# Patient Record
Sex: MALE | Race: BLACK OR AFRICAN AMERICAN | NOT HISPANIC OR LATINO | URBAN - METROPOLITAN AREA
[De-identification: names, ages, dates, MRNs, and addresses within clinical notes are randomized per-mention and may not be internally consistent; named-entity substitution may affect disease eponyms.]

---

## 2017-04-15 ENCOUNTER — EMERGENCY (EMERGENCY)
Facility: HOSPITAL | Age: 22
LOS: 1 days | Discharge: HOME | End: 2017-04-15
Attending: EMERGENCY MEDICINE | Admitting: EMERGENCY MEDICINE
Payer: COMMERCIAL

## 2017-04-15 VITALS
DIASTOLIC BLOOD PRESSURE: 56 MMHG | TEMPERATURE: 99 F | RESPIRATION RATE: 20 BRPM | OXYGEN SATURATION: 97 % | HEART RATE: 68 BPM | SYSTOLIC BLOOD PRESSURE: 122 MMHG

## 2017-04-15 DIAGNOSIS — M25.522 PAIN IN LEFT ELBOW: ICD-10-CM

## 2017-04-15 DIAGNOSIS — Z79.899 OTHER LONG TERM (CURRENT) DRUG THERAPY: ICD-10-CM

## 2017-04-15 DIAGNOSIS — M77.02 MEDIAL EPICONDYLITIS, LEFT ELBOW: ICD-10-CM

## 2017-04-15 DIAGNOSIS — Z87.828 PERSONAL HISTORY OF OTHER (HEALED) PHYSICAL INJURY AND TRAUMA: Chronic | ICD-10-CM

## 2017-04-15 PROCEDURE — 99283 EMERGENCY DEPT VISIT LOW MDM: CPT

## 2017-04-15 PROCEDURE — 73080 X-RAY EXAM OF ELBOW: CPT

## 2017-04-15 PROCEDURE — 73080 X-RAY EXAM OF ELBOW: CPT | Mod: 26,LT

## 2017-04-15 RX ORDER — IBUPROFEN 200 MG
600 TABLET ORAL ONCE
Qty: 0 | Refills: 0 | Status: COMPLETED | OUTPATIENT
Start: 2017-04-15 | End: 2017-04-15

## 2017-04-15 RX ADMIN — Medication 600 MILLIGRAM(S): at 11:01

## 2017-04-15 NOTE — ED PROVIDER NOTE - PHYSICAL EXAMINATION
+td medial epicondyle area of left elbow, equistely tender to palpation, +inflammation, no reddness, NV intact

## 2017-04-15 NOTE — ED PROVIDER NOTE - OBJECTIVE STATEMENT
Dr. Lundberg Note: 22M with previous left knee injury was walking up stairs yesterday morning when that knee gave out and he landed on his left elbow.  Initially not much pain but this morning woke up with severe pain to ulnar side of elbow especially with movement and touch, assoc with paresthesia of pinky finger.  No motor weakness, no rash, no fever.

## 2018-02-21 ENCOUNTER — EMERGENCY (EMERGENCY)
Facility: HOSPITAL | Age: 23
LOS: 1 days | Discharge: HOME | End: 2018-02-21
Attending: EMERGENCY MEDICINE | Admitting: EMERGENCY MEDICINE
Payer: COMMERCIAL

## 2018-02-21 VITALS
OXYGEN SATURATION: 100 % | WEIGHT: 229.72 LBS | SYSTOLIC BLOOD PRESSURE: 133 MMHG | HEART RATE: 80 BPM | DIASTOLIC BLOOD PRESSURE: 65 MMHG | TEMPERATURE: 98 F | RESPIRATION RATE: 18 BRPM

## 2018-02-21 VITALS
RESPIRATION RATE: 16 BRPM | DIASTOLIC BLOOD PRESSURE: 71 MMHG | TEMPERATURE: 99 F | HEART RATE: 85 BPM | OXYGEN SATURATION: 100 % | SYSTOLIC BLOOD PRESSURE: 122 MMHG

## 2018-02-21 DIAGNOSIS — Z87.828 PERSONAL HISTORY OF OTHER (HEALED) PHYSICAL INJURY AND TRAUMA: Chronic | ICD-10-CM

## 2018-02-21 PROCEDURE — 99283 EMERGENCY DEPT VISIT LOW MDM: CPT

## 2018-02-21 RX ORDER — CIPROFLOXACIN AND DEXAMETHASONE 3; 1 MG/ML; MG/ML
4 SUSPENSION/ DROPS AURICULAR (OTIC)
Qty: 0.5 | Refills: 0 | OUTPATIENT
Start: 2018-02-21 | End: 2018-02-27

## 2018-02-21 RX ORDER — PSEUDOEPHEDRINE HCL 30 MG
1 TABLET ORAL
Qty: 28 | Refills: 0
Start: 2018-02-21 | End: 2018-02-27

## 2018-02-21 NOTE — ED PROVIDER NOTE - ATTENDING CONTRIBUTION TO CARE
Smita Mosley MD - Attending Physician: I have personally seen and examined this patient with the resident/fellow.  I have fully participated in the care of this patient. I have reviewed all pertinent clinical information, including history, physical exam, plan and the Resident/Fellow’s note and agree except as noted. See MDM

## 2018-02-21 NOTE — ED PROVIDER NOTE - OBJECTIVE STATEMENT
Pt reports 3 days of tension headache and right ear pain. Reports in Nov 17, had ear pain and was given ear drops and oral antibtiocs, which improved the symptoms but now they have returned. He has not seen ENT before. Notes no fever. No neck pain. No runny nose. No sore throat. Took an oxycodone for his pain with minimal relief. Did not try anything else for his symptoms Pt reports 3 days of tension headache and right ear pain. Reports in Nov 17, had ear pain and was given ear drops and oral antibiotics, which improved the symptoms but now they have returned. He has not seen ENT before. Notes no fever. No neck pain. No runny nose. No sore throat. Took an oxycodone for his pain with minimal relief. Did not try anything else for his symptoms. Notes no hearing changes. No drainage

## 2018-02-21 NOTE — ED PROVIDER NOTE - MEDICAL DECISION MAKING DETAILS
Smita Mosley MD - Attending Physician: Pt here with right ear pain and headache. Noted effusions with some sinus congestion. Right ear canal inflamed with some erythema to TM but no purulence or bulging. Trial of pseudoephedrine, ciprodex and will given f/u to ENT if pain continues

## 2018-03-06 ENCOUNTER — EMERGENCY (EMERGENCY)
Facility: HOSPITAL | Age: 23
LOS: 1 days | Discharge: HOME | End: 2018-03-06
Attending: EMERGENCY MEDICINE | Admitting: EMERGENCY MEDICINE
Payer: MEDICAID

## 2018-03-06 VITALS
DIASTOLIC BLOOD PRESSURE: 68 MMHG | OXYGEN SATURATION: 100 % | RESPIRATION RATE: 18 BRPM | HEART RATE: 79 BPM | TEMPERATURE: 98 F | SYSTOLIC BLOOD PRESSURE: 114 MMHG | WEIGHT: 225.09 LBS

## 2018-03-06 VITALS
OXYGEN SATURATION: 100 % | SYSTOLIC BLOOD PRESSURE: 136 MMHG | RESPIRATION RATE: 15 BRPM | DIASTOLIC BLOOD PRESSURE: 78 MMHG | TEMPERATURE: 99 F | HEART RATE: 64 BPM

## 2018-03-06 DIAGNOSIS — Z87.828 PERSONAL HISTORY OF OTHER (HEALED) PHYSICAL INJURY AND TRAUMA: Chronic | ICD-10-CM

## 2018-03-06 LAB
ALBUMIN SERPL ELPH-MCNC: 4.3 G/DL — SIGNIFICANT CHANGE UP (ref 3.3–5)
ALP SERPL-CCNC: 66 U/L — SIGNIFICANT CHANGE UP (ref 40–120)
ALT FLD-CCNC: 42 U/L RC — SIGNIFICANT CHANGE UP (ref 10–45)
ANION GAP SERPL CALC-SCNC: 16 MMOL/L — SIGNIFICANT CHANGE UP (ref 5–17)
AST SERPL-CCNC: 36 U/L — SIGNIFICANT CHANGE UP (ref 10–40)
BASOPHILS # BLD AUTO: 0.1 K/UL — SIGNIFICANT CHANGE UP (ref 0–0.2)
BASOPHILS NFR BLD AUTO: 1.6 % — SIGNIFICANT CHANGE UP (ref 0–2)
BILIRUB SERPL-MCNC: 0.3 MG/DL — SIGNIFICANT CHANGE UP (ref 0.2–1.2)
BUN SERPL-MCNC: 13 MG/DL — SIGNIFICANT CHANGE UP (ref 7–23)
CALCIUM SERPL-MCNC: 9.6 MG/DL — SIGNIFICANT CHANGE UP (ref 8.4–10.5)
CHLORIDE SERPL-SCNC: 103 MMOL/L — SIGNIFICANT CHANGE UP (ref 96–108)
CO2 SERPL-SCNC: 20 MMOL/L — LOW (ref 22–31)
CREAT SERPL-MCNC: 0.91 MG/DL — SIGNIFICANT CHANGE UP (ref 0.5–1.3)
EOSINOPHIL # BLD AUTO: 0.1 K/UL — SIGNIFICANT CHANGE UP (ref 0–0.5)
EOSINOPHIL NFR BLD AUTO: 1.5 % — SIGNIFICANT CHANGE UP (ref 0–6)
GAS PNL BLDV: SIGNIFICANT CHANGE UP
GLUCOSE SERPL-MCNC: 79 MG/DL — SIGNIFICANT CHANGE UP (ref 70–99)
HCT VFR BLD CALC: 49.8 % — SIGNIFICANT CHANGE UP (ref 39–50)
HGB BLD-MCNC: 16.4 G/DL — SIGNIFICANT CHANGE UP (ref 13–17)
LYMPHOCYTES # BLD AUTO: 1.4 K/UL — SIGNIFICANT CHANGE UP (ref 1–3.3)
LYMPHOCYTES # BLD AUTO: 24.8 % — SIGNIFICANT CHANGE UP (ref 13–44)
MCHC RBC-ENTMCNC: 30.5 PG — SIGNIFICANT CHANGE UP (ref 27–34)
MCHC RBC-ENTMCNC: 33 GM/DL — SIGNIFICANT CHANGE UP (ref 32–36)
MCV RBC AUTO: 92.3 FL — SIGNIFICANT CHANGE UP (ref 80–100)
MONOCYTES # BLD AUTO: 0.4 K/UL — SIGNIFICANT CHANGE UP (ref 0–0.9)
MONOCYTES NFR BLD AUTO: 6.9 % — SIGNIFICANT CHANGE UP (ref 2–14)
NEUTROPHILS # BLD AUTO: 3.8 K/UL — SIGNIFICANT CHANGE UP (ref 1.8–7.4)
NEUTROPHILS NFR BLD AUTO: 65.3 % — SIGNIFICANT CHANGE UP (ref 43–77)
PLATELET # BLD AUTO: 288 K/UL — SIGNIFICANT CHANGE UP (ref 150–400)
POTASSIUM SERPL-MCNC: 5 MMOL/L — SIGNIFICANT CHANGE UP (ref 3.5–5.3)
POTASSIUM SERPL-SCNC: 5 MMOL/L — SIGNIFICANT CHANGE UP (ref 3.5–5.3)
PROT SERPL-MCNC: 8 G/DL — SIGNIFICANT CHANGE UP (ref 6–8.3)
RBC # BLD: 5.39 M/UL — SIGNIFICANT CHANGE UP (ref 4.2–5.8)
RBC # FLD: 12 % — SIGNIFICANT CHANGE UP (ref 10.3–14.5)
SODIUM SERPL-SCNC: 139 MMOL/L — SIGNIFICANT CHANGE UP (ref 135–145)
WBC # BLD: 5.8 K/UL — SIGNIFICANT CHANGE UP (ref 3.8–10.5)
WBC # FLD AUTO: 5.8 K/UL — SIGNIFICANT CHANGE UP (ref 3.8–10.5)

## 2018-03-06 PROCEDURE — 82803 BLOOD GASES ANY COMBINATION: CPT

## 2018-03-06 PROCEDURE — 99284 EMERGENCY DEPT VISIT MOD MDM: CPT

## 2018-03-06 PROCEDURE — 70450 CT HEAD/BRAIN W/O DYE: CPT

## 2018-03-06 PROCEDURE — 83605 ASSAY OF LACTIC ACID: CPT

## 2018-03-06 PROCEDURE — 96375 TX/PRO/DX INJ NEW DRUG ADDON: CPT | Mod: XU

## 2018-03-06 PROCEDURE — 84295 ASSAY OF SERUM SODIUM: CPT

## 2018-03-06 PROCEDURE — 70481 CT ORBIT/EAR/FOSSA W/DYE: CPT

## 2018-03-06 PROCEDURE — 80053 COMPREHEN METABOLIC PANEL: CPT

## 2018-03-06 PROCEDURE — 96374 THER/PROPH/DIAG INJ IV PUSH: CPT | Mod: XU

## 2018-03-06 PROCEDURE — 85027 COMPLETE CBC AUTOMATED: CPT

## 2018-03-06 PROCEDURE — 82947 ASSAY GLUCOSE BLOOD QUANT: CPT

## 2018-03-06 PROCEDURE — 82330 ASSAY OF CALCIUM: CPT

## 2018-03-06 PROCEDURE — 85014 HEMATOCRIT: CPT

## 2018-03-06 PROCEDURE — 82435 ASSAY OF BLOOD CHLORIDE: CPT

## 2018-03-06 PROCEDURE — 70470 CT HEAD/BRAIN W/O & W/DYE: CPT | Mod: 26

## 2018-03-06 PROCEDURE — 99284 EMERGENCY DEPT VISIT MOD MDM: CPT | Mod: 25

## 2018-03-06 PROCEDURE — 84132 ASSAY OF SERUM POTASSIUM: CPT

## 2018-03-06 PROCEDURE — 82962 GLUCOSE BLOOD TEST: CPT

## 2018-03-06 RX ORDER — METOCLOPRAMIDE HCL 10 MG
10 TABLET ORAL ONCE
Qty: 0 | Refills: 0 | Status: COMPLETED | OUTPATIENT
Start: 2018-03-06 | End: 2018-03-06

## 2018-03-06 RX ORDER — ACETAMINOPHEN 500 MG
1000 TABLET ORAL ONCE
Qty: 0 | Refills: 0 | Status: COMPLETED | OUTPATIENT
Start: 2018-03-06 | End: 2018-03-06

## 2018-03-06 RX ORDER — NAPROXEN AND ESOMEPRAZOLE MAGNESIUM 375; 20 MG/1; MG/1
0 TABLET, DELAYED RELEASE ORAL
Qty: 0 | Refills: 0 | COMMUNITY

## 2018-03-06 RX ORDER — SODIUM CHLORIDE 9 MG/ML
1000 INJECTION INTRAMUSCULAR; INTRAVENOUS; SUBCUTANEOUS ONCE
Qty: 0 | Refills: 0 | Status: COMPLETED | OUTPATIENT
Start: 2018-03-06 | End: 2018-03-06

## 2018-03-06 RX ADMIN — SODIUM CHLORIDE 1000 MILLILITER(S): 9 INJECTION INTRAMUSCULAR; INTRAVENOUS; SUBCUTANEOUS at 11:20

## 2018-03-06 RX ADMIN — Medication 1000 MILLIGRAM(S): at 14:19

## 2018-03-06 RX ADMIN — Medication 10 MILLIGRAM(S): at 11:22

## 2018-03-06 RX ADMIN — Medication 400 MILLIGRAM(S): at 11:25

## 2018-03-06 NOTE — ED PROVIDER NOTE - MEDICAL DECISION MAKING DETAILS
22yoM wit recurrent ear infections, small vesciles in right audtory canal. concern for hsv vs infectious etioogy of symptoms. gauri ct to rule out stroke, mastoiditis, send labs, give meds for symptoms of headache and call neurology and ent for further eval and follow up.

## 2018-03-06 NOTE — ED PROVIDER NOTE - PLAN OF CARE
1) Follow up with your doctor in the next 2-3 days.   2) Return to the ER immediately for new or worsening symptoms including severe pain or other issues.   3) Call Dr. Coleman  [phone] today for an appointment in the next few days.

## 2018-03-06 NOTE — ED ADULT NURSE NOTE - CHPI ED SYMPTOMS NEG
no change in level of consciousness/no blurred vision/no fever/no nausea/no loss of consciousness/no confusion

## 2018-03-06 NOTE — ED PROVIDER NOTE - OBJECTIVE STATEMENT
22yoM hx of recurrent ear infections, pw right facial numbness and pain associated with headache, right sided, throbbing. patient started having facial numbness on the right invovling his upper and lower face. denies any trauma, weakness, chest pain, sob, abd pain, fevers, chills, nausea, vomiting, or other issues. 22yoM hx of recurrent ear infections, pw right facial numbness and pain associated with headache, right sided, throbbing. patient started having facial numbness on the right involving his upper and lower face, last 12 hours. denies any trauma, weakness, chest pain, sob, abd pain, fevers, chills, nausea, vomiting, or other issues.

## 2018-03-06 NOTE — CONSULT NOTE ADULT - ATTENDING COMMENTS
No evidence of ear pathology. No herpetic lesions suggestive of zoster and clinical scenario/duration inconsistent with zoster.

## 2018-03-06 NOTE — ED PROVIDER NOTE - CARE PLAN
Principal Discharge DX:	Headache  Assessment and plan of treatment:	1) Follow up with your doctor in the next 2-3 days.   2) Return to the ER immediately for new or worsening symptoms including severe pain or other issues.   3) Call Dr. Coleman  [phone] today for an appointment in the next few days.

## 2018-03-06 NOTE — ED ADULT NURSE NOTE - OBJECTIVE STATEMENT
22 year old male to ER c/o R sided head/neck pain and numbness since 10pm last night. Pt hx of chronic ear infection to R ear since October. Has had multiple visits to PCP and ED and has been treated with antibiotics. Pt states he has not seen an ENT and is requesting referral to an ENT. Pt states pain is "20/10" and has taken ibuprofen with minimal relief. Pt c/o dizziness and eyelids feel heavy. A&Ox4, DE LA TORRE strong/equal, pupils 3/brisk bilaterally, sensation intact except pt states decreased to R side of head. Pt denies change in vision, blurry vision, cp, SOB. Pt states he feels depressed but denies SI/HI. NAD at this time. Friend is at bedside with patient. 22 year old male to ER c/o R sided head/neck pain and numbness since 10pm last night. Pt hx of chronic ear infection to R ear since October. Has had multiple visits to PCP and ED and has been treated with antibiotics. Pt states he has not seen an ENT and is requesting referral to an ENT. Pt states pain is "20/10" and has taken ibuprofen with minimal relief. Pt c/o dizziness and eyelids feel heavy. A&Ox4, DE LA TORRE strong/equal, pupils 3/brisk bilaterally, sensation intact except pt states numb to R side of head. Pt denies change in vision, blurry vision, cp, SOB. Pt states he feels depressed but denies SI/HI. NAD at this time. Friend is at bedside with patient.

## 2018-03-06 NOTE — ED PROVIDER NOTE - PHYSICAL EXAMINATION
ear with small vesicle in external auditory canal. RIGHT  TM normal. ear with small vesicle in external auditory canal. RIGHT  TM normal.    right upper extremity with decrase  strength.

## 2018-03-06 NOTE — CONSULT NOTE ADULT - ASSESSMENT
23 y/o M p/w R sided HA associated with ear pain that began in 10/2017, occurs daily for approximately several hours a day, has been given antibiotics for ear infection with no relief. HA is worse with bending down. Never had HA's before. For past couple days he has unusual tingly/sensory loss sensation over R face/occipital region.  Given new HA that's been daily since 10/2017, MRI brain is warranted for further workup.  Also recommending further evaluation for potential zoster as vesicles in R ear on exam and potentially the tingling/sensory symptoms on R face are prodromal symptoms preceding a zoster rash.    -MRI brain w/wo benny if not contraindicated  -further evaluation for potential zoster as vesicles in R ear on exam and potentially the tingling/sensory symptoms on R face are prodromal symptoms preceding a zoster rash, per ED team (consult ID if indicated)

## 2018-03-06 NOTE — ED PROVIDER NOTE - ATTENDING CONTRIBUTION TO CARE
Private Physician Rafael Sebastian ([facility]/[place])  22y male pmh otitis media. No dm,htn.hld,habits,trauma, employed as , Pt comes to ed complains of Pain/numbness rt face onset last night.  Has complains of ha "since 10/2017"  Ha are daily improves with motrin, no change in vision. fever and chills,sob,cp, nvdc, Pain mod severe, last tylenol taken last night. PE [facility] male nad normocephalic atraumatic chest clear anterior & posterior cv no rubs, gallops or murmurs, neuro cn 2-12 intact. power 4.5/5 rue/rle, 5.5 left  pain light touch romberg, gait,nl gcs 15 speech fluent a&ox3  Ezequiel Bourne MD, Facep

## 2018-03-06 NOTE — CONSULT NOTE ADULT - SUBJECTIVE AND OBJECTIVE BOX
CC: recurrent ear infection b/l with right sided facial numbness    HPI: Patient is a 22y old  Male who we are asked to evaluate the patient for recurrent ear infection b/l with right sided facial numbness and pain . Pt denies any fevers/chills, tinnitus, vertigo, b/l ear discharge, decrease in hearing, no facial tenderness.      PAST MEDICAL & SURGICAL HISTORY:  Chronic pain of left knee  Lumbar disc herniation  No pertinent past medical history  H/O tear of meniscus of knee joint  No significant past surgical history    Allergies    No Known Allergies    Intolerances      MEDICATIONS  (STANDING):  acetaminophen  IVPB. 1000 milliGRAM(s) IV Intermittent once  metoclopramide Injectable 10 milliGRAM(s) IV Push once  sodium chloride 0.9% Bolus 1000 milliLiter(s) IV Bolus once    MEDICATIONS  (PRN):      Social History: no etoh, no tobacco    ROS: ENT, GI, , CV, Pulm, Neuro, Psych, MS, Heme, Endo, Constitutional; all negative except as noted in HPI    Vital Signs Last 24 Hrs  T(C): 36.4 (06 Mar 2018 09:01), Max: 36.4 (06 Mar 2018 09:01)  T(F): 97.5 (06 Mar 2018 09:01), Max: 97.5 (06 Mar 2018 09:01)  HR: 79 (06 Mar 2018 09:01) (79 - 79)  BP: 114/68 (06 Mar 2018 09:01) (114/68 - 114/68)  BP(mean): --  RR: 18 (06 Mar 2018 09:01) (18 - 18)  SpO2: 100% (06 Mar 2018 09:01) (100% - 100%)              PHYSICAL EXAM:  Gen: NAD, well-developed  Head: Normocephalic, Atraumatic  Face: no edema/erythema/fluctuance, parotid glands soft without mass  Eyes: PERRL, EOMI, no scleral injection  Ears: Right - ear canal clear, TM intact without effusion / erythema.  No evidence of any fluid drainage.  No Mastoid tenderness / erythema/ ear bulging            Left - ear canal clear, TM intact without effusion / erythema.  No evidence of any fluid drainage.  No Mastoid tenderness / erythema/ ear bulging  Nose: Nares bilaterally patent, no discharge  Mouth: No Stridor / Drooling / Trismus.  Mucosa moist, tongue/uvula midline, oropharynx clear  Neck: Flat, supple, no lymphadenopathy, trachea midline, no masses  Resp: breathing easily, no stridor  CV: no peripheral edema/cyanosis    Fiberoptic Indirect laryngoscopy:  (With Scope #2) CC: recurrent ear infection b/l with right sided facial numbness    HPI: Patient is a 22y old  Male who we are asked to evaluate the patient for recurrent ear infection b/l with right sided facial numbness and pain . Pt denies any fevers/chills, tinnitus, vertigo, b/l ear discharge, decrease in hearing, no facial tenderness.  Was told he had fluid behind the eardrum a few weeks ago and was treated with antibiotics and drops. Denies trauma to the ear canal or use of cotton swabs.     PAST MEDICAL & SURGICAL HISTORY:  Chronic pain of left knee  Lumbar disc herniation  No pertinent past medical history  H/O tear of meniscus of knee joint  No significant past surgical history    Allergies    No Known Allergies    Intolerances      MEDICATIONS  (STANDING):  acetaminophen  IVPB. 1000 milliGRAM(s) IV Intermittent once  metoclopramide Injectable 10 milliGRAM(s) IV Push once  sodium chloride 0.9% Bolus 1000 milliLiter(s) IV Bolus once    MEDICATIONS  (PRN):      Social History: no etoh, no tobacco    ROS: ENT, GI, , CV, Pulm, Neuro, Psych, MS, Heme, Endo, Constitutional; all negative except as noted in HPI    Vital Signs Last 24 Hrs  T(C): 36.4 (06 Mar 2018 09:01), Max: 36.4 (06 Mar 2018 09:01)  T(F): 97.5 (06 Mar 2018 09:01), Max: 97.5 (06 Mar 2018 09:01)  HR: 79 (06 Mar 2018 09:01) (79 - 79)  BP: 114/68 (06 Mar 2018 09:01) (114/68 - 114/68)  BP(mean): --  RR: 18 (06 Mar 2018 09:01) (18 - 18)  SpO2: 100% (06 Mar 2018 09:01) (100% - 100%)              PHYSICAL EXAM:  Gen: NAD, well-developed  Head: Normocephalic, Atraumatic  Face: no edema/erythema/fluctuance, parotid glands soft without mass, reports decreased sensation along right upper face and right head  Eyes: PERRL, EOMI, no scleral injection  Ears: Right - ear canal clear other than small flake of skin, no vesicles or lesions, TM intact without effusion / erythema.  No evidence of any fluid drainage.  No Mastoid tenderness / erythema/ ear bulging            Left - ear canal clear, TM intact without effusion / erythema.  No evidence of any fluid drainage.  No Mastoid tenderness / erythema/ ear bulging  Nose: Nares bilaterally patent, no discharge  Mouth: No Stridor / Drooling / Trismus.  Mucosa moist, tongue/uvula midline, oropharynx clear  Neck: Flat, supple, no lymphadenopathy, trachea midline, no masses  Resp: breathing easily, no stridor  CV: no peripheral edema/cyanosis

## 2018-03-06 NOTE — ED PROVIDER NOTE - PROGRESS NOTE DETAILS
discussed with ENT attending, no intervention at this time.  will dc home with close follow up.   neurolgy said MRI outpatient or inpatient. will follow up in clinic.

## 2018-03-06 NOTE — CONSULT NOTE ADULT - SUBJECTIVE AND OBJECTIVE BOX
HPI: 21 yo male with a history of chronic ear infection since October 2017 presenting with right sided facial numbness. Patient states that in October of 2018 he presented to an urgent care center for a persistent ear ache on his right side. Patient states the pain was more of a gnawing pain which was also associated with a unilateral occipital headache which was managed with over the counter Ibuprofen. No associated nausea, vomiting, changes in vision or dizziness. At that time he was diagnosed with an ear infection and was prescribed antibiotics at an urgent care center. Patient states the ear aches persisted so he visited the urgent care center again and was prescribed antibiotics. About a week ago the patient presented to the ED at [facility] again for the ear ache and headache and was discharged at that time. He currently states that he still has the ear pain and headache however, now there is an associated right side facial weakness. Patient does not report any facial droop, weakness, or imbalance associated with this current episode. Pt endorses a muscular jaw pain/tightness that began upon awakening yesterday which has since resolved and progressed to numbness. Patient also reports a bout of diarrhea from Friday to Sunday which has since resolved. No reported fevers, chills, nausea, vomiting, dizziness, changes in vision or imbalance in the last couple of weeks. Patient was out of the country in [place] in January. Reports no pets at home.   PMH: no PMH  PSH: left knee surgery  FH: no reported family history  Social: patient is in a monogamous relationship.   Medications: Ibuprofren  Allergies: NKDA  Review of Systems:   ? CONSTITUTIONAL: no fevers, chills, recent changes in weight; reports night sweats   ? EYES: no discharge, no irritation, no pain, no redness, no diplopia; visual changes as per HPI   ? ENMT: reports phlegm production. no rhinorrhea, nasal congestion, ear discomfort, hearing changes,   ? CARDIOVASCULAR: no resting chest pain or palpitations; no edema; re   ? RESPIRATORY: no cough, no shortness of breath   ? GASTROINTESTINAL: as per HPI   ? GENITOURINARY: no dysuria, no change in frequency, no hematuria   ? MUSCULOSKELETAL: no arthralgias, no myalgias, no joint swelling   ? SKIN: no abrasions, no rashes   ? NEUROLOGICAL: as per HPI     VS: Temp , BP , HR , RR , O2 sat % on room air   General: Well-appearing, well-nourished, well-groomed male appearing stated age supine on stretcher  Skin: no rash, jaundice, ecchymoses, or lesions  HEENT: PERRLA, EOMI, oropharynx clear; bilateral optic disc edema; vesicles noted in right ear, left ear exam was limited due to an abundance of wax.   Neck: supple  Musculoskeletal: No arthralgias, no myalgias, no swelling or deformities, no limitation of passive movement   Extremities: normal pulses throughout, no cyanosis   Neuro:   ? Orientation: accurately named year, season, date, day, month, state, country, town, hospital, and president.   ? Registration: 3/3 objects   ? Attention and calculation: spelled "world" correctly backwards   ? recall:   ? Language: able to name watch and pen, repeated "no ifs, ands or buts," able to perform 3 step command.   ? Speech: clear, fluent and appropriate   ? Cranial Nerves: Patient has dimished light touch sensation on right side vs left; visual field intact, EOMI, symmetric smile, no facial droop, brow raise symmetric, hearing grossly intact, palate elvation and uvula intact, trapezius and SCM 5/5, tongue protrusion midline.   ? Motor: Normal bulk (no atrophy), normal tone (no rigidity), no pronator drift; 5/5 bilateral deltoids, biceps, triceps, wrist flexion and extension; 5/5 bilateral hip flexion and extension; 5/5 bilateral hip adduction, abduction; 5/5 bilateral knee flexion and extension, 5/5 bilateral plantarflexion and dorsiflexion   ? Sensory:, light touch, and temperature sensation grossly normal and equal in all proximal and distal upper extremities; light touch and temperature sensation grossly normal and equal in all proximal lower extremities; pinprick, light touch, and vibration sensation grossly normal and equal in all distal lower extremities proprioception of big toes intact bilaterally   ? Reflexes: Biceps, brachioradialis, and triceps reflexes ­­__+ bilaterally; patellar and achilles reflexes __bilaterally; plantar reflex downgoing bilaterally   ? Coordination: bilateral finger to nose testing normal,   ? Gait: not assessed.      Labs/imaging reviewed

## 2018-10-22 ENCOUNTER — APPOINTMENT (OUTPATIENT)
Dept: ORTHOPEDIC SURGERY | Facility: CLINIC | Age: 23
End: 2018-10-22

## 2018-10-22 PROBLEM — Z00.00 ENCOUNTER FOR PREVENTIVE HEALTH EXAMINATION: Status: ACTIVE | Noted: 2018-10-22

## 2018-12-26 ENCOUNTER — EMERGENCY (EMERGENCY)
Facility: HOSPITAL | Age: 23
LOS: 1 days | Discharge: HOME | End: 2018-12-26
Attending: EMERGENCY MEDICINE | Admitting: EMERGENCY MEDICINE
Payer: COMMERCIAL

## 2018-12-26 ENCOUNTER — EMERGENCY (EMERGENCY)
Facility: HOSPITAL | Age: 23
LOS: 1 days | End: 2018-12-26

## 2018-12-26 VITALS
OXYGEN SATURATION: 100 % | WEIGHT: 220.02 LBS | HEIGHT: 74 IN | DIASTOLIC BLOOD PRESSURE: 79 MMHG | RESPIRATION RATE: 16 BRPM | SYSTOLIC BLOOD PRESSURE: 135 MMHG | HEART RATE: 57 BPM | TEMPERATURE: 98 F

## 2018-12-26 VITALS
DIASTOLIC BLOOD PRESSURE: 80 MMHG | WEIGHT: 220.02 LBS | SYSTOLIC BLOOD PRESSURE: 135 MMHG | HEART RATE: 69 BPM | TEMPERATURE: 98 F | OXYGEN SATURATION: 98 % | HEIGHT: 74 IN | RESPIRATION RATE: 15 BRPM

## 2018-12-26 DIAGNOSIS — Z87.828 PERSONAL HISTORY OF OTHER (HEALED) PHYSICAL INJURY AND TRAUMA: Chronic | ICD-10-CM

## 2018-12-26 PROCEDURE — 99282 EMERGENCY DEPT VISIT SF MDM: CPT

## 2018-12-26 PROCEDURE — 99283 EMERGENCY DEPT VISIT LOW MDM: CPT

## 2018-12-26 NOTE — ED PROVIDER NOTE - LOWER EXTREMITY EXAM, LEFT
mild tenderness on Apley Grind maneuver with the foot inverted on the left side. no obvious deformity noted.

## 2018-12-26 NOTE — ED PROVIDER NOTE - OBJECTIVE STATEMENT
22 y/o M pt presents to the ED c/o left knee pain since June 2018. Associated with mild swelling and redness of the left knee. Pain aggravated with movement. Pt states that he had a left medial meniscal repair surgery in June. He has also gone to PT for the same, with no relief of sx. He has not seen his orthopedist recently for the sx, because of a conflict with his workers compensation. Pt has been taking Advil for the pain, with mild relief of sx. Denies numbness or weakness in extremities. No other complaints at this time. Pt stated that he was looking to get an MRI of the knee.     Dr. Culp- orthopedist (in [facility])

## 2018-12-26 NOTE — ED PROVIDER NOTE - MEDICAL DECISION MAKING DETAILS
Patient with pain since his meniscal surgery 6 months ago, came hoping to get an MRI. Does not want plain xray. Instructed to f/u with his orthopedist

## 2018-12-26 NOTE — ED ADULT NURSE REASSESSMENT NOTE - NS ED NURSE REASSESS COMMENT FT1
pt eating crackers and apple sauce , reevaluated by PA, pt discharged, pt requesting to stay till IVF completed, PA aware.

## 2018-12-27 ENCOUNTER — EMERGENCY (EMERGENCY)
Facility: HOSPITAL | Age: 23
LOS: 1 days | Discharge: HOME | End: 2018-12-27
Attending: EMERGENCY MEDICINE
Payer: COMMERCIAL

## 2018-12-27 VITALS
WEIGHT: 220.02 LBS | HEART RATE: 80 BPM | SYSTOLIC BLOOD PRESSURE: 121 MMHG | DIASTOLIC BLOOD PRESSURE: 71 MMHG | RESPIRATION RATE: 18 BRPM | TEMPERATURE: 99 F | OXYGEN SATURATION: 100 % | HEIGHT: 74 IN

## 2018-12-27 DIAGNOSIS — Z87.828 PERSONAL HISTORY OF OTHER (HEALED) PHYSICAL INJURY AND TRAUMA: Chronic | ICD-10-CM

## 2018-12-27 PROCEDURE — 73562 X-RAY EXAM OF KNEE 3: CPT

## 2018-12-27 PROCEDURE — 73562 X-RAY EXAM OF KNEE 3: CPT | Mod: 26,LT

## 2018-12-27 PROCEDURE — 99283 EMERGENCY DEPT VISIT LOW MDM: CPT

## 2018-12-27 NOTE — ED PROVIDER NOTE - NSFOLLOWUPINSTRUCTIONS_ED_ALL_ED_FT
Follow up with your Orthopedist or Dr. Kaz Hannon - [phone] for further evaluation and management of your knee pain.

## 2018-12-27 NOTE — ED PROVIDER NOTE - OBJECTIVE STATEMENT
Attending note. Patient was seen in the fast track room #5. Patient has left knee pain for greater than 2 years and recently experienced increased pain in the medial aspect of the left knee. Patient was injured in May of 2016 while working for the [employer].  Patient had a meniscal repair of the left knee as per the patient now has new medial knee pain with painful clicking, reports locking and knee giving out. Patient went to [facility] yesterday requesting MRI of his knee. Patient is requesting MRI of his knee today. Patient has been taking ibuprofen without improvement. Patient currently sees orthopedist in [place]. Patient also previously saw Dr. Kaz Hannon in [place].  Patient was evaluated by an independent medical examiner recently who also recommended an MRI of his left knee.

## 2018-12-27 NOTE — ED PROVIDER NOTE - MEDICAL DECISION MAKING DETAILS
Attn - new left knee pain on chronic pain with meniscus tear.  requesting MRI of knee.  xray and pt will f/u with his orthopedist.

## 2018-12-27 NOTE — ED ADULT NURSE NOTE - OBJECTIVE STATEMENT
23 y.o. Male presents to the ED c/o L knee pain. Hx L meniscus repair June 14th, 2017. Pt reports seeing PT and ortho intermittently. Approx 2 months ago, pt states hearing a pop on L knee and has been feeling pain. As per pt, he has been taking advil with no relief. Pt reports wanting to get an MRI. Ambulatory. Unable to fully bear weight on L knee. Pt is sitting on stretcher on cellphone. Denies numbness/tingling. Pt is in no current distress. Comfort and safety provided. Will continue to monitor. Awaiting ED MD consult. 23 y.o. Male presents to the ED c/o L knee pain. Hx L meniscus repair June 14th, 2017. Pt reports seeing PT and ortho intermittently. Approx 2 months ago, pt states hearing a pop on L knee and has been feeling pain. As per pt, he has been taking advil with no relief. Pt reports wanting to get an MRI. Ambulatory. Unable to fully bear weight on L leg d/t pain. Pt is on stretcher on cellphone. Denies numbness/tingling. Pt is in no current distress. Comfort and safety provided. Will continue to monitor. Awaiting ED MD consult.

## 2018-12-27 NOTE — ED PROVIDER NOTE - PHYSICAL EXAMINATION
Attending note. Patient is alert in no acute distress. Examination of the lateral extremity reveals no swelling or effusion. Patient has tenderness patella and medial joint line. It is increased pain with varus stress of the knee. Patient has a positive Jackson test with medial joint line pain but without clicking. Lachman test is negative. Patient is able to actively flex his knee to approximately 115°, and reports increased pain with extreme knee flexion. Skin is normal. Sensation is normal. Distal pulses are normal. There is no leg edema. There is no tenderness of the calf or hamstring.

## 2019-01-02 ENCOUNTER — EMERGENCY (EMERGENCY)
Facility: HOSPITAL | Age: 24
LOS: 1 days | Discharge: HOME | End: 2019-01-02
Admitting: EMERGENCY MEDICINE
Payer: MEDICAID

## 2019-01-02 VITALS
RESPIRATION RATE: 16 BRPM | SYSTOLIC BLOOD PRESSURE: 146 MMHG | DIASTOLIC BLOOD PRESSURE: 67 MMHG | OXYGEN SATURATION: 100 % | HEART RATE: 83 BPM | TEMPERATURE: 99 F

## 2019-01-02 DIAGNOSIS — Z87.828 PERSONAL HISTORY OF OTHER (HEALED) PHYSICAL INJURY AND TRAUMA: Chronic | ICD-10-CM

## 2019-01-02 PROCEDURE — 99283 EMERGENCY DEPT VISIT LOW MDM: CPT

## 2019-01-02 NOTE — ED PROVIDER NOTE - MEDICAL DECISION MAKING DETAILS
24 y/o male patient sent in by his therapist for SI. Physical examination completed and unremarkable for any acute issues/problems requiring immediate interventions.  Collateral obtained from patient's sister Karin [phone] who states patient is not suicidal and has no thoughts to harm himself to his knowledge.  Karin also stated that she was with her brother today just before he went to the doctor's appointment and she has no concern for patient's safety or the safety of others if the patient is discharged.  Pt cleared for stable discharge and given [facility] information with discharge paperwork.

## 2019-01-02 NOTE — ED PROVIDER NOTE - OBJECTIVE STATEMENT
24 y/o male patient sent in by his therapist for SI.  Pt states: "I was seeing my therapist, she asked me about my history and was asking me a long list of questions that was about 3 pages long".  "She asked me if I ever have or had dark days and I told her yes".  "I once thought of killing myself back in 2017 when I had lost my job and the unemployment ran out, but I don't have those thoughts now".  "She didn't even say anything to me, next thing you know, my co-workers were bring 24 y/o male patient sent in by his therapist for SI.  Pt states: "I was seeing my therapist, she asked me about my history and was asking me a long list of questions that was about 3 pages long".  "She asked me if I ever have or had dark days and I told her yes".  "I once thought of killing myself back in 2017 when I had lost my job and the unemployment ran out, but I don't have those thoughts now".  "She didn't even say anything to me, next thing you know, my co-workers were bringing me here.  PT now works for [employer] and states he is not suicidal.  He denies SI/HI/AH/VH.

## 2019-01-02 NOTE — ED ADULT NURSE NOTE - OBJECTIVE STATEMENT
Patient received in  c/o psych eval, pt is employed with [employer]. pt denies SI,HI&AH. Denies ETOH and substance use. psych eval ongoing

## 2019-01-02 NOTE — ED ADULT TRIAGE NOTE - CHIEF COMPLAINT QUOTE
Pt sent by therapist after verbalizing suicidal thoughts after he had a knee surgery in 2017.   Pt went to a therapist after he was experiencing insomnia and states he was drinking 3 to 4 glasses wine to go to sleep at nights.   Pt denies SI,  HI, hallucinations

## 2019-05-06 NOTE — ED ADULT TRIAGE NOTE - AS O2 DELIVERY
Clinic Note     Encounter Date: 5/6/2019    Subjective:     Chief Complaint:  Upper respiratory infection symptoms     History of Present Illness:  Dahlia Ferraro is a 31 year old female who presents for evaluation of symptoms of a URI of 10 days duration. Symptoms: dry cough, post nasal drip, sinus and nasal congestion, sore throat and nasal blockage. Symptoms are currently mild.  Course: gradually improving.  Treatment to date: cough suppressants and decongestants  Patient denies fevers, chest pain, shortness of breath, wheezing, hemoptysis, nausea/vomiting/diarrhea, abdominal pain, calf pain/swelling. Non-smoker. History of childhood asthma.    Patient Active Problem List   Diagnosis   • Asthma   • Blepharitis of upper and lower eyelids of both eyes     Current Outpatient Medications   Medication Sig Dispense Refill   • guaiFENesin 200 MG/10ML Solution 10 mL q4hr prn. 300 mL 0     No current facility-administered medications for this visit.      ALLERGIES:   Allergen Reactions   • Opioid Analgesics      vomiting     Social History     Socioeconomic History   • Marital status: Single     Spouse name: Not on file   • Number of children: 0   • Years of education: Not on file   • Highest education level: Not on file   Occupational History   • Occupation: student     Comment: [employer]   Social Needs   • Financial resource strain: Not on file   • Food insecurity:     Worry: Not on file     Inability: Not on file   • Transportation needs:     Medical: Not on file     Non-medical: Not on file   Tobacco Use   • Smoking status: Never Smoker   • Smokeless tobacco: Never Used   Substance and Sexual Activity   • Alcohol use: Not on file   • Drug use: Not on file   • Sexual activity: Not on file   Lifestyle   • Physical activity:     Days per week: Not on file     Minutes per session: Not on file   • Stress: Not on file   Relationships   • Social connections:     Talks on phone: Not on file     Gets together: Not on file      Attends Congregation service: Not on file     Active member of club or organization: Not on file     Attends meetings of clubs or organizations: Not on file     Relationship status: Not on file   • Intimate partner violence:     Fear of current or ex partner: Not on file     Emotionally abused: Not on file     Physically abused: Not on file     Forced sexual activity: Not on file   Other Topics Concern   • Not on file   Social History Narrative   • Not on file        Objective:     Visit Vitals  BP 98/60 (BP Location: [facility] [place] Patient Position: Sitting, Cuff Size: Regular)   Pulse 69   Temp 97.9 °F (36.6 °C) (Tympanic)   Wt 45.8 kg   LMP 04/19/2019   SpO2 99%   BMI 21.11 kg/m²     General: Alert, cooperative, well-appearing in no apparent distress  HEENT: Head: Normocephalic, without obvious abnormality, atraumatic  Eyes: PERRLA. EOMI. Conjunctivae/sclerae normal. No erythema, edema or exudate.  Ears: tympanic membranes pearly gray with normal light reflex and no effusion and normal external ear canals bilaterally  Throat: Lips, mucosa, and tongue normal. Teeth and gums normal  Nose: nares normal, septum midline, mucosa normal, no drainage and sinuses non-tender to percussion   Neck: no cervical or supraclavicular adenopathy, supple, symmetrical, trachea midline and thyroid not enlarged, symmetric, no tenderness/mass/nodules  Lungs: clear to auscultation bilaterally, no wheeze or crackle  Heart: regular rate and rhythm, S1, S2 normal, no murmur, click, rub or gallop   Abdomen: BSWNL, soft, no hepatosplenomegaly, nontender, no mass or scars  Skin: Anicteric. Skin is warm and dry. Not diaphoretic.  Mental status: A&O x 3. has a normal mood and affect. Behavior is normal. Judgment and thought content normal.   Extremities: No pedal edema     Assessment and Plan:      ED Diagnosis   1. Cough  guaiFENesin 200 MG/10ML Solution        Suggested symptomatic OTC remedies.  Discussed dx and tx of URIs  Discussed the importance of  avoiding unnecessary abx therapy.  RTC prn.      - Advised the patient on supportive therapies, including intranasal irrigation with physiologic or hypertonic saline, nasal corticosteroid spray and decongestant if no cardiac history, decreasing exposure to possible allergens, advancement of fluids as tolerated, and over-the-counter acetaminophen or ibuprofen as directed as needed for pain control.  - Instructed the patient to follow up with primary care provider should symptoms worsen or not improve. Patient verbally expressed understanding and all questions were addressed to patient's satisfaction.    The patient was seen and examined by Nicole santos PA-C working in collaboration with Dr. Mikhail Maharaj.     Nicole Lowe PA-C  [facility]   room air

## 2019-07-28 NOTE — ED PROCEDURE NOTE - CPROC ED TIME OUT STATEMENT1
Patient's first and last name, , procedure, and correct site confirmed prior to the start of procedure.
0 = swallows foods/liquids without difficulty

## 2019-09-26 ENCOUNTER — EMERGENCY (EMERGENCY)
Facility: HOSPITAL | Age: 24
LOS: 1 days | Discharge: HOME | End: 2019-09-26
Attending: EMERGENCY MEDICINE
Payer: MEDICAID

## 2019-09-26 VITALS
SYSTOLIC BLOOD PRESSURE: 114 MMHG | HEIGHT: 74 IN | DIASTOLIC BLOOD PRESSURE: 69 MMHG | HEART RATE: 62 BPM | RESPIRATION RATE: 17 BRPM | WEIGHT: 235.01 LBS | TEMPERATURE: 98 F | OXYGEN SATURATION: 100 %

## 2019-09-26 DIAGNOSIS — Z87.828 PERSONAL HISTORY OF OTHER (HEALED) PHYSICAL INJURY AND TRAUMA: Chronic | ICD-10-CM

## 2019-09-26 LAB
ALBUMIN SERPL ELPH-MCNC: 4.9 G/DL — SIGNIFICANT CHANGE UP (ref 3.3–5)
ALP SERPL-CCNC: 62 U/L — SIGNIFICANT CHANGE UP (ref 40–120)
ALT FLD-CCNC: 52 U/L — HIGH (ref 10–45)
ANION GAP SERPL CALC-SCNC: 11 MMOL/L — SIGNIFICANT CHANGE UP (ref 5–17)
APTT BLD: 31.9 SEC — SIGNIFICANT CHANGE UP (ref 27.5–36.3)
AST SERPL-CCNC: 32 U/L — SIGNIFICANT CHANGE UP (ref 10–40)
BASOPHILS # BLD AUTO: 0 K/UL — SIGNIFICANT CHANGE UP (ref 0–0.2)
BASOPHILS NFR BLD AUTO: 0.8 % — SIGNIFICANT CHANGE UP (ref 0–2)
BILIRUB SERPL-MCNC: 0.3 MG/DL — SIGNIFICANT CHANGE UP (ref 0.2–1.2)
BUN SERPL-MCNC: 11 MG/DL — SIGNIFICANT CHANGE UP (ref 7–23)
CALCIUM SERPL-MCNC: 9.5 MG/DL — SIGNIFICANT CHANGE UP (ref 8.4–10.5)
CHLORIDE SERPL-SCNC: 103 MMOL/L — SIGNIFICANT CHANGE UP (ref 96–108)
CO2 SERPL-SCNC: 25 MMOL/L — SIGNIFICANT CHANGE UP (ref 22–31)
CREAT SERPL-MCNC: 0.97 MG/DL — SIGNIFICANT CHANGE UP (ref 0.5–1.3)
EOSINOPHIL # BLD AUTO: 0.2 K/UL — SIGNIFICANT CHANGE UP (ref 0–0.5)
EOSINOPHIL NFR BLD AUTO: 3.2 % — SIGNIFICANT CHANGE UP (ref 0–6)
GLUCOSE SERPL-MCNC: 122 MG/DL — HIGH (ref 70–99)
HCT VFR BLD CALC: 42.5 % — SIGNIFICANT CHANGE UP (ref 39–50)
HGB BLD-MCNC: 14.8 G/DL — SIGNIFICANT CHANGE UP (ref 13–17)
INR BLD: 1.03 RATIO — SIGNIFICANT CHANGE UP (ref 0.88–1.16)
LYMPHOCYTES # BLD AUTO: 1.4 K/UL — SIGNIFICANT CHANGE UP (ref 1–3.3)
LYMPHOCYTES # BLD AUTO: 25.2 % — SIGNIFICANT CHANGE UP (ref 13–44)
MCHC RBC-ENTMCNC: 30.8 PG — SIGNIFICANT CHANGE UP (ref 27–34)
MCHC RBC-ENTMCNC: 34.8 GM/DL — SIGNIFICANT CHANGE UP (ref 32–36)
MCV RBC AUTO: 88.6 FL — SIGNIFICANT CHANGE UP (ref 80–100)
MONOCYTES # BLD AUTO: 0.3 K/UL — SIGNIFICANT CHANGE UP (ref 0–0.9)
MONOCYTES NFR BLD AUTO: 5.9 % — SIGNIFICANT CHANGE UP (ref 2–14)
NEUTROPHILS # BLD AUTO: 3.7 K/UL — SIGNIFICANT CHANGE UP (ref 1.8–7.4)
NEUTROPHILS NFR BLD AUTO: 64.9 % — SIGNIFICANT CHANGE UP (ref 43–77)
PLATELET # BLD AUTO: 286 K/UL — SIGNIFICANT CHANGE UP (ref 150–400)
POTASSIUM SERPL-MCNC: 3.9 MMOL/L — SIGNIFICANT CHANGE UP (ref 3.5–5.3)
POTASSIUM SERPL-SCNC: 3.9 MMOL/L — SIGNIFICANT CHANGE UP (ref 3.5–5.3)
PROT SERPL-MCNC: 7.5 G/DL — SIGNIFICANT CHANGE UP (ref 6–8.3)
PROTHROM AB SERPL-ACNC: 11.9 SEC — SIGNIFICANT CHANGE UP (ref 10–12.9)
RBC # BLD: 4.8 M/UL — SIGNIFICANT CHANGE UP (ref 4.2–5.8)
RBC # FLD: 12 % — SIGNIFICANT CHANGE UP (ref 10.3–14.5)
SODIUM SERPL-SCNC: 139 MMOL/L — SIGNIFICANT CHANGE UP (ref 135–145)
WBC # BLD: 5.7 K/UL — SIGNIFICANT CHANGE UP (ref 3.8–10.5)
WBC # FLD AUTO: 5.7 K/UL — SIGNIFICANT CHANGE UP (ref 3.8–10.5)

## 2019-09-26 PROCEDURE — 70553 MRI BRAIN STEM W/O & W/DYE: CPT | Mod: 26

## 2019-09-26 PROCEDURE — 70450 CT HEAD/BRAIN W/O DYE: CPT | Mod: 26

## 2019-09-26 PROCEDURE — 99284 EMERGENCY DEPT VISIT MOD MDM: CPT

## 2019-09-26 PROCEDURE — 70549 MR ANGIOGRAPH NECK W/O&W/DYE: CPT | Mod: 26

## 2019-09-26 PROCEDURE — 70544 MR ANGIOGRAPHY HEAD W/O DYE: CPT | Mod: 26,59

## 2019-09-26 PROCEDURE — 99218: CPT

## 2019-09-26 RX ORDER — ACETAMINOPHEN 500 MG
650 TABLET ORAL ONCE
Refills: 0 | Status: COMPLETED | OUTPATIENT
Start: 2019-09-26 | End: 2019-09-26

## 2019-09-26 RX ORDER — METOCLOPRAMIDE HCL 10 MG
10 TABLET ORAL ONCE
Refills: 0 | Status: COMPLETED | OUTPATIENT
Start: 2019-09-26 | End: 2019-09-26

## 2019-09-26 RX ORDER — ACETAMINOPHEN 500 MG
650 TABLET ORAL EVERY 6 HOURS
Refills: 0 | Status: DISCONTINUED | OUTPATIENT
Start: 2019-09-26 | End: 2019-10-05

## 2019-09-26 RX ADMIN — Medication 650 MILLIGRAM(S): at 16:30

## 2019-09-26 RX ADMIN — Medication 650 MILLIGRAM(S): at 20:28

## 2019-09-26 RX ADMIN — Medication 650 MILLIGRAM(S): at 13:38

## 2019-09-26 RX ADMIN — Medication 10 MILLIGRAM(S): at 13:37

## 2019-09-26 RX ADMIN — Medication 650 MILLIGRAM(S): at 21:00

## 2019-09-26 NOTE — ED PROVIDER NOTE - CLINICAL SUMMARY MEDICAL DECISION MAKING FREE TEXT BOX
24M w/ persistent headache - s/p TPA, w/ CTH concerning for L MCA infarct - will check CT, labs here, give sx relief, consult neurology, reassess

## 2019-09-26 NOTE — ED ADULT NURSE REASSESSMENT NOTE - COMFORT CARE
plan of care explained/po fluids offered/repositioned/warm blanket provided/ambulated to bathroom/darkened lights
ambulated to bathroom/plan of care explained

## 2019-09-26 NOTE — ED ADULT NURSE NOTE - CHIEF COMPLAINT QUOTE
had a headache and syncopal episode in [place], felt like he could move the right side of his body was seen in ED there and given TPA on saturday  returned from [place] last night and still having headaches
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
## 2019-09-26 NOTE — ED PROVIDER NOTE - PMH
Chronic pain of left knee    Lumbar disc herniation    No pertinent past medical history Chronic pain of left knee    Lumbar disc herniation

## 2019-09-26 NOTE — ED ADULT TRIAGE NOTE - CHIEF COMPLAINT QUOTE
had a headache and syncopal episode in [place], felt like he could move the right side of his body was seen in ED there and given TPA on saturday  returned from [place] last night and still having headaches

## 2019-09-26 NOTE — ED ADULT NURSE REASSESSMENT NOTE - GENERAL PATIENT STATE
smiling/interactive/comfortable appearance/improvement verbalized/cooperative/resting/sleeping
improvement verbalized/comfortable appearance/family/SO at bedside/cooperative/smiling/interactive/resting/sleeping

## 2019-09-26 NOTE — ED PROVIDER NOTE - PSH
H/O tear of meniscus of knee joint    No significant past surgical history H/O tear of meniscus of knee joint

## 2019-09-26 NOTE — ED CDU PROVIDER INITIAL DAY NOTE - PROGRESS NOTE DETAILS
CDU NOTE YARI SHEPPARD: Pt resting comfortably, but notes 5-6/10 H/A. no weakness, numbness/tingling, vision/speech changes. wants something for pain. doesn't want reglan; states it made him lethargic and he felt like he couldn't function. NAD, VSS. No events on telemetry. neuro exam normal, no deficits. will give tyelnol for H/A. will continue monitoring.

## 2019-09-26 NOTE — ED PROVIDER NOTE - ATTENDING CONTRIBUTION TO CARE
24M presenting to the ED w/ headache x 6 days; patient states that he was in [place] since 9/12 for vacation/rehab of his L knee, on Friday 9/20 patient had headache, nausea/blurry vision, headache was not getting better with Tylenol/Advil, 9/21 patient woke up with R face, arm and leg weakness and mom noticed slurred speech, went to hospital in [place] where there was concern for hyperdense L MCA sign on CT, was given tPA. Patient not discharged on ASA at that time. Weakness has improved since then but headache, it is moderate, dull in quality, has not resolved, frontal to posterior, denies exacerbating factors, alleviated with Tylenol/Advil.  He was not discharged with any meds as he wanted secondary evaluation in the .    ROS:  GEN: (-) fevers/chills, (-) weight loss, (-) fatigue/malaise  HEENT: (-) visual change, (-) photophobia  NECK: (-) stiffness, (-) swelling  RESP: (-) shortness of breath, (-) cough, (-) sputum, (-) hemoptysis, (-) WEBSTER  CV: (-) chest pain, (-) palpitations  GI: (-) nausea, (-) vomiting, (-) pain, (-) constipation, (-) diarrhea, (-) melena, (-) BRBPR  : (-) frequency/urgency, (-) hematuria, (-) dysuria, (-) incontinence, (-) discharge  EXT: (-) edema, (-) cyanosis  ENDO: (-) heat/cold intolerance, (-) polyuria  NEURO: (-) paresthesias, (+) weakness, (+) headache, (-) dizziness, (-) syncope  MSK: no muscle pain   SKIN: (+) rash    PMHx & PSHx: As documented in EMR/above.  Allergies: NKDA  SocHx: Denies smoking, drugs; social ETOH use    PHYSICAL EXAMINATION:  VITALS REVIEWED.  VS hypertensive, otherwise normal  GENERALIZED APPEARANCE:  Comfortable, no acute distress, ambulating without difficulty  SKIN:  Warm, dry, no cyanosis  HEAD:  No obvious scalp lesions  EYES:  Conjunctiva pink, no icterus  ENMT:  Mucus membranes moist, no stridor  NECK:  Supple, non-tender  CHEST AND RESPIRATORY:  Clear to auscultation B/L, good air entry B/L, equal chest expansion  HEART AND CARDIOVASCULAR:  Regular rate, no obvious murmur  ABDOMEN AND GI:  Soft, non-tender, non-distended.  No rebound, no guarding, no CVA-area tenderness  EXTREMITIES:  No deformity, edema, or calf tenderness  NEURO: AAOx3, gross motor and sensory intact, CN2-12 intact, strength 5/5 B/L UE/LE mild R arm pronator drift, mild right L drift that does not hit bed in 5 seconds, language clear, good repetition, no dysarthria, cerebellar exam (finger to nose, rapid alternating movements) intact  PSYCH: Normal affect    Impression:  Headache s/p TPa in [place] ([place]) for hyperdense MCA sign on CT there; r/o complication/bleed, ?stroke; given age ?hypercoagulability vs. autoimmune; labs, imaging, neurology evaluation, re-eval

## 2019-09-26 NOTE — ED PROVIDER NOTE - OBJECTIVE STATEMENT
24M w/ pmh 24M w/out pertinent pmh -- p/w headache x 6 days. Was visiting [place] (country), started having diffuse headache over whole head, 5 days ago was found to be slurring speech, not moving right side of his body, went to hospital at the time - they checked CTH showing hyperdense L MCA consistent w/ hyperacute infarct and gave patient TPA. Patient was admitted for 2 days, the weakness improved, was discharged. Patient has had persistent headaches since then, returned from [place] yesterday.  Wasn't discharged on any meds.

## 2019-09-26 NOTE — ED CDU PROVIDER INITIAL DAY NOTE - OBJECTIVE STATEMENT
24M w/out pertinent pmh -- p/w headache x 6 days. Was visiting [place] ([place]), started having diffuse headache over whole head, 5 days ago was found to be slurring speech, not moving right side of his body, went to hospital at the time - they checked CTH showing hyperdense L MCA consistent w/ hyperacute infarct and gave patient TPA. Patient was admitted for 2 days, the weakness improved, was discharged. Patient has had persistent headaches since then, returned from [place] yesterday.  Wasn't discharged on any meds.

## 2019-09-26 NOTE — ED CDU PROVIDER INITIAL DAY NOTE - ATTENDING CONTRIBUTION TO CARE
24M presenting to the ED w/ headache x 6 days; patient states that he was in [place] ([place]) since 9/12 for vacation/rehab of his L knee, on Friday 9/20 patient had headache, nausea/blurry vision, headache was not getting better with Tylenol/Advil, 9/21 patient woke up with R face, arm and leg weakness and mom noticed slurred speech, went to hospital in [place] where there was concern for hyperdense L MCA sign on CT, was given tPA. Patient not discharged on ASA at that time. Weakness has improved since then but headache, it is moderate, dull in quality, has not resolved, frontal to posterior, denies exacerbating factors, alleviated with Tylenol/Advil.  He was not discharged with any meds as he wanted secondary evaluation in the . Patient also reports eczematous rash recently.    ROS:  GEN: (-) fevers/chills, (-) weight loss, (-) fatigue/malaise  HEENT: (-) visual change, (-) photophobia  NECK: (-) stiffness, (-) swelling  RESP: (-) shortness of breath, (-) cough, (-) sputum, (-) hemoptysis, (-) WEBSTER  CV: (-) chest pain, (-) palpitations  GI: (-) nausea, (-) vomiting, (-) pain, (-) constipation, (-) diarrhea, (-) melena, (-) BRBPR  : (-) frequency/urgency, (-) hematuria, (-) dysuria, (-) incontinence, (-) discharge  EXT: (-) edema, (-) cyanosis  ENDO: (-) heat/cold intolerance, (-) polyuria  NEURO: (-) paresthesias, (+) weakness, (+) headache, (-) dizziness, (-) syncope  MSK: no muscle pain   SKIN: (+) rash    PMHx & PSHx: As documented in EMR/above.  Allergies: NKDA  SocHx: Denies smoking, drugs; social ETOH use    PHYSICAL EXAMINATION:  VITALS REVIEWED.  VS hypertensive, otherwise normal  GENERALIZED APPEARANCE:  Comfortable, no acute distress, ambulating without difficulty  SKIN:  Warm, dry, no cyanosis, eczematous rash  HEAD:  No obvious scalp lesions  EYES:  Conjunctiva pink, no icterus  ENMT:  Mucus membranes moist, no stridor  NECK:  Supple, non-tender  CHEST AND RESPIRATORY:  Clear to auscultation B/L, good air entry B/L, equal chest expansion  HEART AND CARDIOVASCULAR:  Regular rate, no obvious murmur  ABDOMEN AND GI:  Soft, non-tender, non-distended.  No rebound, no guarding, no CVA-area tenderness  EXTREMITIES:  No deformity, edema, or calf tenderness  NEURO: AAOx3, gross motor and sensory intact, CN2-12 intact, strength 5/5 B/L UE/LE mild R arm pronator drift, mild right L drift that does not hit bed in 5 seconds, language clear, good repetition, no dysarthria, cerebellar exam (finger to nose, rapid alternating movements) intact  PSYCH: Normal affect    Impression:  Headache s/p TPa in [place] ([place]) for hyperdense MCA sign on CT there; r/o complication/bleed, ?stroke; given age ?hypercoagulability vs. autoimmune.  Neurology evaluation recommend - MRI/MRA ordered; hypercoagulability work up to be sent, further testing per neurology, neurochecks, CDU monitoring.
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
## 2019-09-26 NOTE — ED ADULT NURSE NOTE - ED STAT RN HANDOFF DETAILS
Pt back from MRI, CDU bed ready for pt. Pt to CDU with family member.   Pt AAOx4, NAD, resp nonlabored, resting comfortably in bed with family member at bedside. Pt denies dizziness, chest pain, palpitations, SOB, abd pain, n/v/d, fevers, chills, weakness at this time. Pt transported to CDU.

## 2019-09-26 NOTE — ED ADULT NURSE NOTE - OBJECTIVE STATEMENT
24 year old male presented to ED with c/o of intermittent sharp headache x6 days. pt was visiting [place] and started to have 10/10 headache, slurred speech, and right side weakness. pt went to hospital in [place] and CT showed L MCA w/ hyperacute infarct and pt received TPA. pt admitted for 2 days then discharged. Pt states pt has been having headaches ever since. no pmh. pt denies CP, SOB, nausea/vomiting, numbness/tingling, fever, cough, chills, dizziness, blurred vision, neuro intact. pt a&ox3, lung sounds clear, heart rate regular, abdomen soft nontender nondistended to palp. skin intact. Will continue to monitor and assess while offering support and reassurance.

## 2019-09-26 NOTE — CONSULT NOTE ADULT - ATTENDING COMMENTS
VASCULAR NEUROLOGY ATTENDING  The patient is seen and examined the history and imaging are reviewed. I agree with the resident note unless otherwise noted. LICA dissection L MCA infarct. Start ASA statin. Will need repeat MRA in 4-6 weeks. Advised avoid all activities that cause neck manipulation.

## 2019-09-26 NOTE — ED CDU PROVIDER INITIAL DAY NOTE - NSCAREINITIATED _GEN_ER
Problem: Post op day 2 CABG/Heart Valve Replacement  Goal: Optimal care of the post op CABG/heart valve replacement post op day 2    Intervention: FSBS: when 2 consecutive BS < 130 after post op day 2, discontinue FSBS unless patient is insulin dependent diabetic  Blood sugars continues to be out of range.   Intervention: Daily Weights  Done NOC  Intervention: Up in chair for all meals  Complete  Intervention: Ambulate, increasing the distance each time x 3 and before bed  Completed. Able to ambulate 150ft and up to bathroom with one person assistance   Intervention: Stand at sink and wash up with assistance.  Clean incisions twice daily with soap and water.  Incisions cleaned in AM  Intervention: IS q 1 hour while awake and record best IS volume  Best IS 1500  Intervention: Consider pacer wire removal by MD  Wires removed by APRN  Intervention: Consider removal of syed and chest tube if not already done  Done         Scar Schultz(Resident)

## 2019-09-26 NOTE — ED ADULT NURSE NOTE - CHPI ED NUR SYMPTOMS NEG
no vomiting/no nausea/no chills/no decreased eating/drinking/no fever/no pain/no weakness/no dizziness/no tingling

## 2019-09-27 VITALS
DIASTOLIC BLOOD PRESSURE: 72 MMHG | TEMPERATURE: 98 F | SYSTOLIC BLOOD PRESSURE: 131 MMHG | RESPIRATION RATE: 16 BRPM | HEART RATE: 62 BPM | OXYGEN SATURATION: 100 %

## 2019-09-27 LAB
CHOLEST SERPL-MCNC: 181 MG/DL — SIGNIFICANT CHANGE UP (ref 10–199)
HBA1C BLD-MCNC: 5.6 % — SIGNIFICANT CHANGE UP (ref 4–5.6)
HCYS SERPL-MCNC: 10.9 UMOL/L — SIGNIFICANT CHANGE UP
HDLC SERPL-MCNC: 44 MG/DL — SIGNIFICANT CHANGE UP
LIPID PNL WITH DIRECT LDL SERPL: 118 MG/DL — HIGH
TOTAL CHOLESTEROL/HDL RATIO MEASUREMENT: 4.1 RATIO — SIGNIFICANT CHANGE UP (ref 3.4–9.6)
TRIGL SERPL-MCNC: 97 MG/DL — SIGNIFICANT CHANGE UP (ref 10–149)

## 2019-09-27 PROCEDURE — 86146 BETA-2 GLYCOPROTEIN ANTIBODY: CPT

## 2019-09-27 PROCEDURE — G0378: CPT

## 2019-09-27 PROCEDURE — 81241 F5 GENE: CPT

## 2019-09-27 PROCEDURE — 81291 MTHFR GENE: CPT

## 2019-09-27 PROCEDURE — 99217: CPT

## 2019-09-27 PROCEDURE — 83036 HEMOGLOBIN GLYCOSYLATED A1C: CPT

## 2019-09-27 PROCEDURE — 99284 EMERGENCY DEPT VISIT MOD MDM: CPT | Mod: 25

## 2019-09-27 PROCEDURE — 85303 CLOT INHIBIT PROT C ACTIVITY: CPT

## 2019-09-27 PROCEDURE — 93306 TTE W/DOPPLER COMPLETE: CPT | Mod: 26

## 2019-09-27 PROCEDURE — 81240 F2 GENE: CPT

## 2019-09-27 PROCEDURE — 80053 COMPREHEN METABOLIC PANEL: CPT

## 2019-09-27 PROCEDURE — 83090 ASSAY OF HOMOCYSTEINE: CPT

## 2019-09-27 PROCEDURE — 85300 ANTITHROMBIN III ACTIVITY: CPT

## 2019-09-27 PROCEDURE — 70553 MRI BRAIN STEM W/O & W/DYE: CPT

## 2019-09-27 PROCEDURE — 70450 CT HEAD/BRAIN W/O DYE: CPT

## 2019-09-27 PROCEDURE — 86038 ANTINUCLEAR ANTIBODIES: CPT

## 2019-09-27 PROCEDURE — 86147 CARDIOLIPIN ANTIBODY EA IG: CPT

## 2019-09-27 PROCEDURE — A9585: CPT

## 2019-09-27 PROCEDURE — 70544 MR ANGIOGRAPHY HEAD W/O DYE: CPT

## 2019-09-27 PROCEDURE — 85730 THROMBOPLASTIN TIME PARTIAL: CPT

## 2019-09-27 PROCEDURE — 80061 LIPID PANEL: CPT

## 2019-09-27 PROCEDURE — 85027 COMPLETE CBC AUTOMATED: CPT

## 2019-09-27 PROCEDURE — 85610 PROTHROMBIN TIME: CPT

## 2019-09-27 PROCEDURE — 96374 THER/PROPH/DIAG INJ IV PUSH: CPT | Mod: XU

## 2019-09-27 PROCEDURE — 93005 ELECTROCARDIOGRAM TRACING: CPT | Mod: 76

## 2019-09-27 PROCEDURE — 85307 ASSAY ACTIVATED PROTEIN C: CPT

## 2019-09-27 PROCEDURE — G0452: CPT | Mod: 26

## 2019-09-27 PROCEDURE — 93306 TTE W/DOPPLER COMPLETE: CPT

## 2019-09-27 PROCEDURE — 85306 CLOT INHIBIT PROT S FREE: CPT

## 2019-09-27 PROCEDURE — 70549 MR ANGIOGRAPH NECK W/O&W/DYE: CPT

## 2019-09-27 RX ORDER — IBUPROFEN 200 MG
0 TABLET ORAL
Qty: 0 | Refills: 0 | DISCHARGE

## 2019-09-27 RX ORDER — ATORVASTATIN CALCIUM 80 MG/1
1 TABLET, FILM COATED ORAL
Qty: 14 | Refills: 0
Start: 2019-09-27 | End: 2019-10-10

## 2019-09-27 RX ORDER — ASPIRIN/CALCIUM CARB/MAGNESIUM 324 MG
81 TABLET ORAL DAILY
Refills: 0 | Status: DISCONTINUED | OUTPATIENT
Start: 2019-09-27 | End: 2019-10-05

## 2019-09-27 RX ORDER — AMPICILLIN SODIUM AND SULBACTAM SODIUM 250; 125 MG/ML; MG/ML
0 INJECTION, POWDER, FOR SUSPENSION INTRAMUSCULAR; INTRAVENOUS
Qty: 0 | Refills: 0 | DISCHARGE

## 2019-09-27 RX ADMIN — Medication 81 MILLIGRAM(S): at 11:56

## 2019-09-27 NOTE — ED CDU PROVIDER DISPOSITION NOTE - CLINICAL COURSE
25y/o M w/out pertinent pmh p/w headache x 6 days. Was visiting [place] (country), started having diffuse headache over whole head, 5 days ago was found to be slurring speech, not moving right side of his body, went to hospital at the time - they checked CTH showing hyperdense L MCA consistent w/ hyperacute infarct and gave patient TPA. Patient was admitted for 2 days, the weakness improved, was discharged. Patient has had persistent headaches since then, returned from [place] yesterday.  Wasn't discharged on any meds.  In ED, labs unremarkable, CTH showed L frontal low attenuation. neuro saw pt, recommended MRI/MRA and TTE. pt sent to CDU for continued work up and monitoring.   In CDU, _________ 25y/o M w/out pertinent pmh p/w headache x 6 days. Was visiting [place] (country), started having diffuse headache over whole head, 5 days ago was found to be slurring speech, not moving right side of his body, went to hospital at the time - they checked CTH showing hyperdense L MCA consistent w/ hyperacute infarct and gave patient TPA. Patient was admitted for 2 days, the weakness improved, was discharged. Patient has had persistent headaches since then, returned from [place] yesterday.  Wasn't discharged on any meds.  In ED, labs unremarkable, CTH showed L frontal low attenuation. neuro saw pt, recommended MRI/MRA and TTE. pt sent to CDU for continued work up and monitoring.   In CDU, MRI/MRA showed Left ICA dissection and subacute infarcts. Echo was normal. pt was seen by neurology and cleared for discharge home on baby asa and outpt follow up. 25y/o M w/out pertinent pmh p/w headache x 6 days. Was visiting [place] (country), started having diffuse headache over whole head, 5 days ago was found to be slurring speech, not moving right side of his body, went to hospital at the time - they checked CTH showing hyperdense L MCA consistent w/ hyperacute infarct and gave patient TPA. Patient was admitted for 2 days, the weakness improved, was discharged. Patient has had persistent headaches since then, returned from [place] yesterday.  Wasn't discharged on any meds.  In ED, labs unremarkable, CTH showed L frontal low attenuation. neuro saw pt, recommended MRI/MRA and TTE. pt sent to CDU for continued work up and monitoring.   In CDU, MRI/MRA showed Left ICA dissection and subacute infarcts. Echo was normal. pt was seen by neurology and cleared for discharge home on baby asa and outpt follow up.  Pt had 4 abberent atrial beats concerning for WPW. pt seen by cardiology and cleared for discharge home with outpt cardiology and EP follow up.

## 2019-09-27 NOTE — ED CDU PROVIDER DISPOSITION NOTE - CARE PROVIDERS DIRECT ADDRESSES
,DirectAddress_Unknown ,DirectAddress_Unknown,[email or web address]

## 2019-09-27 NOTE — ED CDU PROVIDER SUBSEQUENT DAY NOTE - PHYSICAL EXAMINATION
*GEN:   comfortable, in no acute distress, AOx3  *EYES:   pupils equally round and reactive to light, extra-occular movements intact  *CV:   S1S2 regular rate and rhythm  *RESP:   clear to auscultation bilaterally, non-labored  *EXTREM:   no MSK tenderness, full ROM throughout  *NEURO:   AOx3, cranial nerves intact throughout, strength 5/5, no focal loss of sensation, no pronator drift, finger/nose normal, ambulating w/ normal gait

## 2019-09-27 NOTE — ED ADULT NURSE REASSESSMENT NOTE - NS ED NURSE REASSESS COMMENT FT1
Neuro contacted due to lab work ordered needing consent prior to draw, per neuro currently giving sign out, will come to cdu once sign out completed.
Transport here to take pt to ED.
Received pt from BARB Sharma. received pt alert and responsive, oriented x4, denies any respiratory distress, SOB, or difficulty breathing. Pt transferred to CDU for headache, states 6/10 head "tenderness" will medicate for pain as ordered. Neuro check completed, intact no deficits noted. Pt pending TTE in Am pt aware of plan. On telemetry pt is SR hr:70's. IV in place, patent and free of signs of infiltration, pt denies chest pain or palpitations, V/S stable.  Pt educated on unit and unit rules, instructed patient to notify RN of any needed assistance, Pt verbalizes understanding, Call bell placed within reach. Safety maintained. Will continue to monitor.

## 2019-09-27 NOTE — CONSULT NOTE ADULT - SUBJECTIVE AND OBJECTIVE BOX
**********              CARDIOLOGY CONSULT NOTE              ************  ============================================================  CHIEF COMPLAINT/REASON FOR CONSULT:  Patient is a 24y old  Male who presents with a chief complaint of     HISTORY OF PRESENT ILLNESS:  24yMale with a history of Chronic L Knee Pain, presenting with stroke and L ICA dissection L MCA infarct - cardiology consulted for WPW Pattern on notable telemetry events.    Patient tells me that he woke up with a headache with associated nausea and blurry vision, took advil and tylenol, but the headache took several hours to get better, was not associated with position. The next day, Sat Sept 21st, patient tells me he woke up with right face, arm, and leg weakness and his mom noticed he had slurred speech.    Telemetry review shows 4 ectopic atrial beats with a very short VT interval, possible delta wave, prolonged QRS, and slurred ST segment. Baseline EKG normal.   He reports he was told in the past he may have WPW based on an electrocardiogram. Reportedly had a normal TTE and stress EKG.    No known family history of arrhythmia, SCD, palpitations. No personal history of palpitations nor syncope.        Allergies    No Known Allergies    Intolerances    	    MEDICATIONS:  aspirin enteric coated 81 milliGRAM(s) Oral daily        acetaminophen   Tablet .. 650 milliGRAM(s) Oral every 6 hours PRN            PAST MEDICAL & SURGICAL HISTORY:  Chronic pain of left knee  Lumbar disc herniation  H/O tear of meniscus of knee joint      FAMILY HISTORY:  No pertinent family history in first degree relatives    Mother - HTN      SOCIAL HISTORY:    [ x] Non-smoker  [x] No Illicit Drug Use  [ x] No Excess EtOH Use      REVIEW OF SYSTEMS: (Unless + Before Symptom, it is negative)  Constitutional: [] Fever, []Fatigue, []Weight Changes  Eyes:  []Recent Vision Changes, []Eye Pain  ENT: []Congestion, []Sore Throat  Endocrine: []Excess Sweating, []Temperature Intolerance  Cardiovascular:  []Chest Pain, []Palpitations, []Shortness of Breath, []Pre-syncope, []Syncope,[] LE Swelling  Respiratory:[] Cough, []Congestion,[] Wheezing  Gastrointestinal: [] Abdominal Pain,[] Nausea, []Vomiting  Genitourinary: []Dysuria,[] hematuria  Musculoskeletal: []Joint Pain, []Hip/Knee Injury  Neurologic: [+]Headaches,[] Imbalance, []Weakness [+]stroke  Skin: []Rashes, []hematoma, []purprura    ================================    PHYSICAL EXAM:  T(C): 36.8 (09-27-19 @ 11:56), Max: 36.8 (09-27-19 @ 04:33)  HR: 62 (09-27-19 @ 11:56) (57 - 64)  BP: 131/72 (09-27-19 @ 11:56) (116/70 - 134/74)  RR: 16 (09-27-19 @ 11:56) (16 - 18)  SpO2: 100% (09-27-19 @ 11:56) (95% - 100%)  Wt(kg): --  I&O's Summary    Appearance: Normal; NAD	  Psychiatry: AOx3; Normal Mood/Affect  HEENT:   Normal oral mucosa, EOMI	  Lymphatic: No lymphadenopathy  Cardiovascular: Normal S1 S2, No JVD, No murmurs, No edema  Respiratory: Lungs clear to auscultation, no use of accessory muscles	  Gastrointestinal:  Soft, Non-tender	  Skin: No rashes, No ecchymoses, No cyanosis	  Neurologic: Non-focal, No Focal Deficits  Extremities: Normal range of motion, No clubbing, cyanosis  Vascular: Peripheral pulses palpable 2+ bilaterally, no prominent varicosities    ============================    LABS:	   Labs [id number] @ 21:29	    CBC Full  -  ( 26 Sep 2019 12:58 )  WBC Count : 5.7 K/uL  Hemoglobin : 14.8 g/dL  Hematocrit : 42.5 %  Platelet Count - Automated : 286 K/uL  Mean Cell Volume : 88.6 fl  Mean Cell Hemoglobin : 30.8 pg  Mean Cell Hemoglobin Concentration : 34.8 gm/dL  Auto Neutrophil # : 3.7 K/uL  Auto Lymphocyte # : 1.4 K/uL  Auto Monocyte # : 0.3 K/uL  Auto Eosinophil # : 0.2 K/uL  Auto Basophil # : 0.0 K/uL  Auto Neutrophil % : 64.9 %  Auto Lymphocyte % : 25.2 %  Auto Monocyte % : 5.9 %  Auto Eosinophil % : 3.2 %  Auto Basophil % : 0.8 %    09-26    139  |  103  |  11  ----------------------------<  122<H>  3.9   |  25  |  0.97    Ca    9.5      26 Sep 2019 12:58    TPro  7.5  /  Alb  4.9  /  TBili  0.3  /  DBili  x   /  AST  32  /  ALT  52<H>  /  AlkPhos  62  09-26        =========================================================================  ASSESSMENT:  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     Igor Adames MD, [facility]  [phone]                                                                                                        Total time spent in face-to-face encounter was 80 minutes. > 50 minutes spent in counseling and coordination of care addressing all medical issues listed above. All labs, imaging, consultant reports, and any relevant outside medical records personally reviewed in order to evaluate and manage the patient medically as well as provide coordination of care amongst providers.

## 2019-09-27 NOTE — ED CDU PROVIDER DISPOSITION NOTE - PATIENT PORTAL LINK FT
You can access the FollowMyHealth Patient Portal offered by [facility] by registering at the following website: [email or web address]. By joining [email or web address]’s FollowMyHealth portal, you will also be able to view your health information using other applications (apps) compatible with our system.

## 2019-09-27 NOTE — ED CDU PROVIDER DISPOSITION NOTE - CARE PROVIDER_API CALL
Rey Frank (DO)  Neurology; Vascular Neurology  [address]  [place] [address]  Phone: [phone]  Fax: [phone]  Follow Up Time: 1-3 Days Rey Frank (DO)  Neurology; Vascular Neurology  [address]  [place] [address]  Phone: [phone]  Fax: [phone]  Follow Up Time: 1-3 Days    Igor Adames)  Cardiovascular Disease; Internal Medicine  [address], [address] [place] [address]  Phone: [phone]  Fax: [phone]  Follow Up Time: 1-3 Days    Ritesh Johnson)  Cardiac Electrophysiology; Cardiology; Internal Medicine  [address] [place] [address]  Phone: [phone]  Follow Up Time: 1-3 Days

## 2019-09-27 NOTE — ED CDU PROVIDER SUBSEQUENT DAY NOTE - ATTENDING CONTRIBUTION TO CARE
Attending MD Samayoa:   I personally have seen and examined this patient.  Physician assistant note reviewed and agree on plan of care and except where noted.  See HPI, PE, and MDM for details.

## 2019-09-27 NOTE — ED CDU PROVIDER SUBSEQUENT DAY NOTE - HISTORY
No interval changes since initial CDU provider note. Pt feels well without complaint. H/A resolved. NAD, VSS. sinus mckenzie on tele. neuro exam normal, no deficits. neuro following. CTH showed L frontal low attenuation. pt to get MRI head in AM. will continue monitoring.  Liz Mcghee

## 2019-09-27 NOTE — ED CDU PROVIDER DISPOSITION NOTE - NSFOLLOWUPINSTRUCTIONS_ED_ALL_ED_FT
1. .Drink plenty of fluids to stay hydrated.  2. You will need to follow up with your PMD and neurologist or our neurology clinic at [phone] in 2-3 days. A copy of your results were given to you to bring to your appt.   3. Return to ER for headache, confusion, behavior/speech changes, numbness/tingling/weakness in your arms/legs, or any other concerns. 1. No sports. No chiropractor manipulation. Drink plenty of fluids to stay hydrated.  2. You will need to follow up with Dr. Frank - Neurologist next week. Call for appointment. A copy of your results were given to you for follow-up.  Follow up with your medical doctor as well or medicine clinic in 2-3 days.   3. Take Aspirin 81mg daily.  4. Return to ER for worsening headache, confusion, behavior/speech changes, numbness/tingling/weakness in your arms/legs, or any other concerns. 1. No sports. No chiropractor manipulation. Drink plenty of fluids to stay hydrated.  2. You will need to follow up with Dr. Frank - Neurologist next week. Call for appointment. A copy of your results were given to you for follow-up.  Follow up with your medical doctor as well or medicine clinic in 2-3 days.   3. Take Aspirin 81mg daily.  4. Follow up with Cardiologist Dr. Horan and Electrophysiologist Dr. Johnson next week.  5. Return to ER for worsening headache, confusion, behavior/speech changes, numbness/tingling/weakness in your arms/legs, or any other concerns. 1. No sports. No chiropractor manipulation. Drink plenty of fluids to stay hydrated.  2. You will need to follow up with Dr. Frank - Neurologist next week. Call for appointment. A copy of your results were given to you for follow-up.  Follow up with your medical doctor as well or medicine clinic in 2-3 days.   3. Take Aspirin 81mg daily.  Take Lipitor daily.  4. Follow up with Cardiologist Dr. Horan and Electrophysiologist Dr. Johnson next week.  5. Return to ER for worsening headache, confusion, behavior/speech changes, numbness/tingling/weakness in your arms/legs, or any other concerns.

## 2019-09-27 NOTE — ED CDU PROVIDER SUBSEQUENT DAY NOTE - PROGRESS NOTE DETAILS
CDU NOTE YARI SHEPPARD: Pt resting comfortably, feeling well without complaint. NAD, VSS. No events on telemetry. neuro exam normal, no deficits. prelim MRI brain is showing watershed infart. neuro aware. will continue neuro checks, f/u final results of MRI/MRAs, and continue monitoring. Pt comfortable. No complaints. Vital signs stable. Will continue to observe and reassess. Awaiting MRI final read and neurology re-evaluation. -Taya Martino PA-C I have personally performed a face to face diagnostic evaluation on this patient.  I have reviewed the ACP note and agree with the history, exam, and plan of care, except as noted.      Young gentleman observed overnight for headache after recent CVA. Neurology service following and recommended MR to r/o new infarcts. Disposition pending neurology service but prelim read this AM from MR is that acute infarcts noted and admission to neurology service anticipated however will follow up final recs from neurology service. I have personally performed a face to face diagnostic evaluation on this patient.  I have reviewed the ACP note and agree with the history, exam, and plan of care, except as noted.     24M with recent CVA sp IV tPA at a hospital in [place] presenting with headache. Observed overnight for MR to r/o new infarct. MRI revealed infarcts in L MCA territory but Dr. Hollis (neurology stroke attending) believes these are subacute infarcts, not acute. MRA showed likely dissection of left which likely explains CVA in this young gentleman. Patient is cleared for discharge with daily aspirin for secondary stroke prevention with close outpatient follow up with neurology. I have personally performed a face to face diagnostic evaluation on this patient.  I have reviewed the ACP note and agree with the history, exam, and plan of care, except as noted.     24M with recent CVA sp IV tPA at a hospital in [place] presenting with headache. Observed overnight for MR to r/o new infarct. MRI revealed infarcts in L MCA territory but Dr. Hollis (neurology stroke attending) believes these are subacute infarcts, not acute. MRA showed likely dissection of left carotid artery which likely explains CVA in this young gentleman. Patient is cleared for discharge with daily aspirin for secondary stroke prevention with close outpatient follow up with neurology. Additional review of telemetry reveals patient had event around 1am of "abberant atrial rhythm" but review of strip shows atrial rhythm with short NC and slurred upstroke concerning for possible WPW. Will consult cardiology/EP for evaluation of abnormal rhythm Pt seen by cardiology Dr. Horan, cleared for discharge home with outpt cardiology and EP follow up.. -Taya Martino PA-C

## 2019-09-27 NOTE — ED CDU PROVIDER DISPOSITION NOTE - NSFOLLOWUPCLINICS_GEN_ALL_ED_FT
[facility] General Internal Medicine  General Internal Medicine  [address] [id number]  Phone: [phone]  Fax:   Follow Up Time: 1-3 Days

## 2019-09-29 LAB
B2 GLYCOPROT1 AB SER QL: NEGATIVE — SIGNIFICANT CHANGE UP
CARDIOLIPIN AB SER-ACNC: NEGATIVE — SIGNIFICANT CHANGE UP

## 2019-09-30 LAB
ANA TITR SER: NEGATIVE — SIGNIFICANT CHANGE UP
AT III ACT/NOR PPP CHRO: 116 % — SIGNIFICANT CHANGE UP (ref 85–135)
DRVVT SCREEN TO CONFIRM RATIO: SIGNIFICANT CHANGE UP
LA NT DPL PPP QL: 33 SEC — SIGNIFICANT CHANGE UP
NORMALIZED SCT PPP-RTO: 0.94 RATIO — SIGNIFICANT CHANGE UP (ref 0–1.16)
NORMALIZED SCT PPP-RTO: SIGNIFICANT CHANGE UP
PROT C ACT/NOR PPP: 121 % — SIGNIFICANT CHANGE UP (ref 74–150)

## 2019-10-01 LAB
APCR PPP: 2.81 RATIO — SIGNIFICANT CHANGE UP
PROT S FREE PPP-ACNC: 143 % NORMAL — SIGNIFICANT CHANGE UP (ref 70–150)

## 2019-10-02 NOTE — ED PROVIDER NOTE - TOBACCO USE
Patient not seen by neuropsychology due to lack of insurance coverage.    Carmen Wu, PhD, LP, ABPP-CN  Clinical Neuropsychologist  Department of Pediatrics                No charge   Never smoker

## 2019-10-03 LAB — PTR INTERPRETATION: SIGNIFICANT CHANGE UP

## 2019-10-04 LAB
DNA PLOIDY SPEC FC-IMP: SIGNIFICANT CHANGE UP
MTHFR GENE INTERPRETATION: SIGNIFICANT CHANGE UP

## 2019-10-05 NOTE — ED POST DISCHARGE NOTE - DETAILS
10/5/19: spoke with pt, he states he is feeling well, discussed results, that clinical significance of this interpretation is unknown and to f/u with hematologist/genetic counsellor Dr. Indigo Tirado. He notes understanding, will call on Monday for appt. -Miri Ballard PA-C

## 2019-10-05 NOTE — ED POST DISCHARGE NOTE - ADDITIONAL DOCUMENTATION
10/5/19: spoke with neuro resident who recommends pt follow up with hematologist Dr. Indigo Tirado ([phone]) for further management as well as continued f/u with Dr. Frank. -Miri Ballard PA-C

## 2019-11-04 PROBLEM — R47.81 SLURRED SPEECH: Status: ACTIVE | Noted: 2019-11-04

## 2019-11-04 PROBLEM — R51 HEADACHE: Status: ACTIVE | Noted: 2019-11-04

## 2019-11-04 PROBLEM — R94.31 ABNORMAL EKG: Status: ACTIVE | Noted: 2019-11-04

## 2019-11-04 RX ORDER — ASPIRIN 81 MG/1
81 TABLET, CHEWABLE ORAL
Refills: 0 | Status: ACTIVE | COMMUNITY

## 2019-11-06 ENCOUNTER — APPOINTMENT (OUTPATIENT)
Dept: ELECTROPHYSIOLOGY | Facility: CLINIC | Age: 24
End: 2019-11-06
Payer: MEDICAID

## 2019-11-06 ENCOUNTER — NON-APPOINTMENT (OUTPATIENT)
Age: 24
End: 2019-11-06

## 2019-11-06 VITALS
HEIGHT: 74 IN | OXYGEN SATURATION: 100 % | SYSTOLIC BLOOD PRESSURE: 119 MMHG | DIASTOLIC BLOOD PRESSURE: 75 MMHG | BODY MASS INDEX: 29.13 KG/M2 | WEIGHT: 227 LBS | HEART RATE: 67 BPM

## 2019-11-06 DIAGNOSIS — Z87.891 PERSONAL HISTORY OF NICOTINE DEPENDENCE: ICD-10-CM

## 2019-11-06 DIAGNOSIS — R51 HEADACHE: ICD-10-CM

## 2019-11-06 DIAGNOSIS — R94.31 ABNORMAL ELECTROCARDIOGRAM [ECG] [EKG]: ICD-10-CM

## 2019-11-06 DIAGNOSIS — R47.81 SLURRED SPEECH: ICD-10-CM

## 2019-11-06 PROCEDURE — 93000 ELECTROCARDIOGRAM COMPLETE: CPT

## 2019-11-06 PROCEDURE — 99203 OFFICE O/P NEW LOW 30 MIN: CPT

## 2019-11-06 RX ORDER — ATORVASTATIN CALCIUM 40 MG/1
40 TABLET, FILM COATED ORAL
Refills: 3 | Status: ACTIVE | COMMUNITY
Start: 2019-11-06

## 2019-11-06 NOTE — HISTORY OF PRESENT ILLNESS
[FreeTextEntry1] : Referring Physician: Igor Adames MD\par \par Dear Arya:\par \par Mr. Eyal Lewis was seen in the [facility] today. For our records, please allow me to summarize the history and my findings.\par \par This pleasant 24 year old man has a cardiovascular history significant for recent admission for CVA following L ICA dissection. While on telemetry he was noted to have 4 beats of what appeared to be pre-excitation. He presents now for follow up. He denies any incidence of palpitations, dizziness, or syncope.\par

## 2019-11-06 NOTE — PHYSICAL EXAM
[General Appearance - Well Developed] : well developed [Normal Appearance] : normal appearance [Well Groomed] : well groomed [General Appearance - Well Nourished] : well nourished [No Deformities] : no deformities [General Appearance - In No Acute Distress] : no acute distress [Normal Conjunctiva] : the conjunctiva exhibited no abnormalities [Eyelids - No Xanthelasma] : the eyelids demonstrated no xanthelasmas [Normal Oral Mucosa] : normal oral mucosa [No Oral Pallor] : no oral pallor [No Oral Cyanosis] : no oral cyanosis [Normal Jugular Venous A Waves Present] : normal jugular venous A waves present [Normal Jugular Venous V Waves Present] : normal jugular venous V waves present [No Jugular Venous Nelson A Waves] : no jugular venous nelson A waves [Heart Rate And Rhythm] : heart rate and rhythm were normal [Heart Sounds] : normal S1 and S2 [Murmurs] : no murmurs present [Respiration, Rhythm And Depth] : normal respiratory rhythm and effort [Exaggerated Use Of Accessory Muscles For Inspiration] : no accessory muscle use [Auscultation Breath Sounds / Voice Sounds] : lungs were clear to auscultation bilaterally [Abdomen Soft] : soft [Abdomen Tenderness] : non-tender [Abdomen Mass (___ Cm)] : no abdominal mass palpated [Abnormal Walk] : normal gait [Gait - Sufficient For Exercise Testing] : the gait was sufficient for exercise testing [Nail Clubbing] : no clubbing of the fingernails [Cyanosis, Localized] : no localized cyanosis [Petechial Hemorrhages (___cm)] : no petechial hemorrhages [Skin Color & Pigmentation] : normal skin color and pigmentation [] : no rash [No Venous Stasis] : no venous stasis [Skin Lesions] : no skin lesions [No Skin Ulcers] : no skin ulcer [No Xanthoma] : no  xanthoma was observed [Oriented To Time, Place, And Person] : oriented to person, place, and time [Affect] : the affect was normal [Mood] : the mood was normal [No Anxiety] : not feeling anxious

## 2019-11-06 NOTE — DISCUSSION/SUMMARY
[FreeTextEntry4] : C [FreeTextEntry1] : In summary, this is a 24 year old man with recent admission for CVA with 4 beats of a wide complex arrhythmia with short CA. Review of strips shows what is likely a slow, idioventricular rhythm which is close to isorhythmic with the sinus node. There is a rS on the precordial telemetry lead suggesting these are PVCs and not basal activation along a pathway. There is no evidence of manifest pre-exciation on multiple ECGs. I offered Mr. Lewis reassurance regarding this episode.\par \par Mr. Lewis appeared to understand the whole discussion and verbalized that all of his questions were answered to his satisfaction.\par \par Thank you for allowing me to be involved in the care of this pleasant man. Please feel free to contact me with any questions.\par \par \par \par Ritesh Johnson MD\par  of Cardiology\par Electrophysiology Section\par [address] [place] [address], [address] [place] [address]\[place] [address]\par Office: [phone]\par Fax: [phone]\par

## 2019-12-11 NOTE — ED ADULT NURSE NOTE - GASTROINTESTINAL ASSESSMENT
December 11, 2019         Patient: Meredith Carver   YOB: 2012   Date of Visit: 12/11/2019           To Whom it May Concern:    Meredith Carver was seen in my clinic on 12/11/2019.     Please excuse her absence due to illness.       She may return to school on Friday.    If you have any questions or concerns, please don't hesitate to call.        Sincerely,           Leonard Chong M.D.  Electronically Signed     
WDL

## 2019-12-22 ENCOUNTER — EMERGENCY (EMERGENCY)
Facility: HOSPITAL | Age: 24
LOS: 1 days | Discharge: HOME | End: 2019-12-22
Attending: EMERGENCY MEDICINE
Payer: MEDICAID

## 2019-12-22 VITALS
RESPIRATION RATE: 18 BRPM | DIASTOLIC BLOOD PRESSURE: 62 MMHG | TEMPERATURE: 98 F | SYSTOLIC BLOOD PRESSURE: 138 MMHG | WEIGHT: 227.96 LBS | OXYGEN SATURATION: 100 % | HEART RATE: 68 BPM | HEIGHT: 74 IN

## 2019-12-22 DIAGNOSIS — Z87.828 PERSONAL HISTORY OF OTHER (HEALED) PHYSICAL INJURY AND TRAUMA: Chronic | ICD-10-CM

## 2019-12-22 PROCEDURE — 99284 EMERGENCY DEPT VISIT MOD MDM: CPT

## 2019-12-23 VITALS
HEART RATE: 58 BPM | RESPIRATION RATE: 18 BRPM | SYSTOLIC BLOOD PRESSURE: 109 MMHG | DIASTOLIC BLOOD PRESSURE: 67 MMHG | OXYGEN SATURATION: 99 % | TEMPERATURE: 98 F

## 2019-12-23 LAB
ALBUMIN SERPL ELPH-MCNC: 4.9 G/DL — SIGNIFICANT CHANGE UP (ref 3.3–5)
ALP SERPL-CCNC: 93 U/L — SIGNIFICANT CHANGE UP (ref 40–120)
ALT FLD-CCNC: 40 U/L — SIGNIFICANT CHANGE UP (ref 10–45)
ANION GAP SERPL CALC-SCNC: 13 MMOL/L — SIGNIFICANT CHANGE UP (ref 5–17)
APTT BLD: 34 SEC — SIGNIFICANT CHANGE UP (ref 27.5–36.3)
AST SERPL-CCNC: 27 U/L — SIGNIFICANT CHANGE UP (ref 10–40)
BASOPHILS # BLD AUTO: 0.08 K/UL — SIGNIFICANT CHANGE UP (ref 0–0.2)
BASOPHILS NFR BLD AUTO: 1.2 % — SIGNIFICANT CHANGE UP (ref 0–2)
BILIRUB SERPL-MCNC: 0.2 MG/DL — SIGNIFICANT CHANGE UP (ref 0.2–1.2)
BUN SERPL-MCNC: 13 MG/DL — SIGNIFICANT CHANGE UP (ref 7–23)
CALCIUM SERPL-MCNC: 10.1 MG/DL — SIGNIFICANT CHANGE UP (ref 8.4–10.5)
CHLORIDE SERPL-SCNC: 103 MMOL/L — SIGNIFICANT CHANGE UP (ref 96–108)
CO2 SERPL-SCNC: 24 MMOL/L — SIGNIFICANT CHANGE UP (ref 22–31)
CREAT SERPL-MCNC: 0.96 MG/DL — SIGNIFICANT CHANGE UP (ref 0.5–1.3)
EOSINOPHIL # BLD AUTO: 0.16 K/UL — SIGNIFICANT CHANGE UP (ref 0–0.5)
EOSINOPHIL NFR BLD AUTO: 2.3 % — SIGNIFICANT CHANGE UP (ref 0–6)
GLUCOSE SERPL-MCNC: 90 MG/DL — SIGNIFICANT CHANGE UP (ref 70–99)
HCT VFR BLD CALC: 43.1 % — SIGNIFICANT CHANGE UP (ref 39–50)
HGB BLD-MCNC: 14.9 G/DL — SIGNIFICANT CHANGE UP (ref 13–17)
IMM GRANULOCYTES NFR BLD AUTO: 0.1 % — SIGNIFICANT CHANGE UP (ref 0–1.5)
INR BLD: 1.12 RATIO — SIGNIFICANT CHANGE UP (ref 0.88–1.16)
LYMPHOCYTES # BLD AUTO: 2.19 K/UL — SIGNIFICANT CHANGE UP (ref 1–3.3)
LYMPHOCYTES # BLD AUTO: 32 % — SIGNIFICANT CHANGE UP (ref 13–44)
MCHC RBC-ENTMCNC: 29.8 PG — SIGNIFICANT CHANGE UP (ref 27–34)
MCHC RBC-ENTMCNC: 34.6 GM/DL — SIGNIFICANT CHANGE UP (ref 32–36)
MCV RBC AUTO: 86.2 FL — SIGNIFICANT CHANGE UP (ref 80–100)
MONOCYTES # BLD AUTO: 0.67 K/UL — SIGNIFICANT CHANGE UP (ref 0–0.9)
MONOCYTES NFR BLD AUTO: 9.8 % — SIGNIFICANT CHANGE UP (ref 2–14)
NEUTROPHILS # BLD AUTO: 3.73 K/UL — SIGNIFICANT CHANGE UP (ref 1.8–7.4)
NEUTROPHILS NFR BLD AUTO: 54.6 % — SIGNIFICANT CHANGE UP (ref 43–77)
NRBC # BLD: 0 /100 WBCS — SIGNIFICANT CHANGE UP (ref 0–0)
PLATELET # BLD AUTO: 309 K/UL — SIGNIFICANT CHANGE UP (ref 150–400)
POTASSIUM SERPL-MCNC: 4.2 MMOL/L — SIGNIFICANT CHANGE UP (ref 3.5–5.3)
POTASSIUM SERPL-SCNC: 4.2 MMOL/L — SIGNIFICANT CHANGE UP (ref 3.5–5.3)
PROT SERPL-MCNC: 7.8 G/DL — SIGNIFICANT CHANGE UP (ref 6–8.3)
PROTHROM AB SERPL-ACNC: 12.9 SEC — SIGNIFICANT CHANGE UP (ref 10–12.9)
RBC # BLD: 5 M/UL — SIGNIFICANT CHANGE UP (ref 4.2–5.8)
RBC # FLD: 12.4 % — SIGNIFICANT CHANGE UP (ref 10.3–14.5)
SODIUM SERPL-SCNC: 140 MMOL/L — SIGNIFICANT CHANGE UP (ref 135–145)
WBC # BLD: 6.84 K/UL — SIGNIFICANT CHANGE UP (ref 3.8–10.5)
WBC # FLD AUTO: 6.84 K/UL — SIGNIFICANT CHANGE UP (ref 3.8–10.5)

## 2019-12-23 PROCEDURE — 85730 THROMBOPLASTIN TIME PARTIAL: CPT

## 2019-12-23 PROCEDURE — 80053 COMPREHEN METABOLIC PANEL: CPT

## 2019-12-23 PROCEDURE — 99283 EMERGENCY DEPT VISIT LOW MDM: CPT

## 2019-12-23 PROCEDURE — 85027 COMPLETE CBC AUTOMATED: CPT

## 2019-12-23 PROCEDURE — 85610 PROTHROMBIN TIME: CPT

## 2019-12-23 NOTE — ED ADULT NURSE NOTE - OBJECTIVE STATEMENT
23 y/o male A&Ox3 with hx of stroke and carotid dissection in September presents to ED for 2 episodes of bright red blood in bowel movements. As per pt, started drinking a beet and orange shake that caused both himself and girlfriend to experience red stools which they assumed was from the beets. Today pt had 2 episodes of blood in soft bowel movement and was worried so came to ED. Pt takes 81mg aspirin daily. No clots noted in stool, no blood in urine. Denies any dysuria or burning with urination. No c/o SOB, CP, weakness, numbness and tingling. Denies fevers, chills, n/v/d. Safety measures maintained with bed in low position and side rails up.

## 2019-12-23 NOTE — ED PROVIDER NOTE - PATIENT PORTAL LINK FT
You can access the FollowMyHealth Patient Portal offered by [facility] by registering at the following website: [email or web address]. By joining [email or web address]’s FollowMyHealth portal, you will also be able to view your health information using other applications (apps) compatible with our system.

## 2019-12-23 NOTE — ED ADULT NURSE NOTE - NS ED NOTE ABUSE SUSPICION NEGLECT YN
FOLLOW UP WITH PRIMARY CARE PHYSICIAN.    MEDICAL COMPLIANCE WITH PLAN DISCUSSED AND RISKS OF NON-COMPLIANCE REVIEWED.  PATIENT EDUCATION COMPLETED ON DISEASE PROCESS, ETIOLOGY & PROGNOSIS.  PATIENT EXPRESSES UNDERSTANDING OF THE PLAN.  PROPER USAGE AND SIDE EFFECTS OF MEDICATIONS REVIEWED & DISCUSSED.     No

## 2019-12-23 NOTE — ED PROVIDER NOTE - OBJECTIVE STATEMENT
Attending Lisa Cartagena: 25 y/o male h/o carotid dissection on aspirin presenting with episode of blood in his stool. pt states he recently had beet shake. has been feeling well. and then noticed small amount of blood on his toilet paper. denies any pain to his abdomen but does report increased pressure sensation. no joint point or recent viral illness. denies any diarrhea. has never had a colonoscopy in the past. no recent abx use. no dysuria. no fevesr or chills. no h/o irritibale bowel or chrons disease. no dysuria. no anal trauma

## 2019-12-23 NOTE — ED PROVIDER NOTE - PHYSICAL EXAMINATION
Attending Lisa Cartagena: Gen: NAD, heent: atrauamtic, eomi, perrla, mmm, op pink, uvula midline, neck; nttp, no nuchal rigidity, chest: nttp, no crepitus, cv: rrr, no murmurs, lungs: ctab, abd: soft, nontender, nondistended, no peritoneal signs, +BS, no guarding, ext: wwp, neg homans, skin: no rash, neuro: awake and alert, following commands, speech clear, sensation and strength intact, no focal deficits rectal; gross blood seen

## 2019-12-23 NOTE — ED PROVIDER NOTE - NSFOLLOWUPINSTRUCTIONS_ED_ALL_ED_FT
It is very important that you call the stomach doctor to follow up  You may require a colonoscopy to further evaluate your bleeding  Please return if you have worsening bleeding, develop any abdominal pain, feel lightheaded, develop fevers or further concerns  You can continue taking your current medications

## 2019-12-23 NOTE — ED PROVIDER NOTE - PROGRESS NOTE DETAILS
Attending Lisa Cartagena: no further bleeding while in the ed. will give GI follow up Attending Lisa Cartagena: pt feeling well. abdomen soft on repeat exams. pt well appearing. will give gi follow up and close return precatuions

## 2019-12-23 NOTE — ED PROVIDER NOTE - CLINICAL SUMMARY MEDICAL DECISION MAKING FREE TEXT BOX
24M on 81mg asa and 40mg atorvastatin for hx of carotid artery dissection o/w healthy presenting for evaluation of gi bleed x1 today, was bright red, on exam o/w well appearing, HR normal rhythm and rate, will check basics, coags, rectal exam with gross blood, follow up studies, reassess, dispo. 24M on 81mg asa and 40mg atorvastatin for hx of carotid artery dissection o/w healthy presenting for evaluation of gi bleed x1 today, was bright red, on exam o/w well appearing, HR normal rhythm and rate, will check basics, coags, rectal exam with gross blood, follow up studies, reassess, dispo.  Attending Lisa Cartagena: 25 y/o male h/o carotid dissection on aspirin presenting with concern for blood in his stool. upon arrival pt well appearing. abdomen soft and nontender,making mesenteric ischemia or acute colitis less likely. pt with small episodes of bloody stool. on rectal exam pt with small amounts of bright red blood. consider possibler diverticular vs internal hemorhoids,. pt hemodynamically stable. will check labs, monitor for worsening bleeding and re-eval. will likely need gi follow up for colonoscopy

## 2019-12-23 NOTE — ED PROVIDER NOTE - CARE PROVIDER_API CALL
Ryann John)  Gastroenterology; Internal Medicine  [address] [facility], [facility] [address]  [place], [place] [id number]  Phone: [phone]  Fax: [phone]  Follow Up Time:

## 2019-12-23 NOTE — ED PROVIDER NOTE - ATTENDING CONTRIBUTION TO CARE
Attending MD Lisa Cartagena:  I personally have seen and examined this patient.  Resident note reviewed and agree on plan of care and except where noted.  See HPI, PE, and MDM for details.

## 2019-12-23 NOTE — ED PROVIDER NOTE - NSFOLLOWUPCLINICS_GEN_ALL_ED_FT
[facility] Gastroenterology  Gastroenterology  [address] [place] [id number]  Phone: [phone]  Fax:   Follow Up Time:

## 2020-06-25 NOTE — ED ADULT NURSE NOTE - CAS ELECT INFOMATION PROVIDED
1. Have you been to the ER, urgent care clinic since your last visit? Hospitalized since your last visit? No    2. Have you seen or consulted any other health care providers outside of the [address] [place] [address] since your last visit? Include any pap smears or colon screening.  No pt seen , treated and d/c by , pt not  assessed by RN.

## 2020-07-15 ENCOUNTER — APPOINTMENT (OUTPATIENT)
Dept: MRI IMAGING | Facility: CLINIC | Age: 25
End: 2020-07-15

## 2020-07-15 ENCOUNTER — APPOINTMENT (OUTPATIENT)
Dept: MRI IMAGING | Facility: CLINIC | Age: 25
End: 2020-07-15
Payer: MEDICAID

## 2020-07-15 ENCOUNTER — OUTPATIENT (OUTPATIENT)
Dept: OUTPATIENT SERVICES | Facility: HOSPITAL | Age: 25
LOS: 1 days | End: 2020-07-15
Payer: MEDICAID

## 2020-07-15 DIAGNOSIS — Z87.828 PERSONAL HISTORY OF OTHER (HEALED) PHYSICAL INJURY AND TRAUMA: Chronic | ICD-10-CM

## 2020-07-15 DIAGNOSIS — Z00.8 ENCOUNTER FOR OTHER GENERAL EXAMINATION: ICD-10-CM

## 2020-07-15 PROCEDURE — 70548 MR ANGIOGRAPHY NECK W/DYE: CPT | Mod: 26

## 2020-07-15 PROCEDURE — 70544 MR ANGIOGRAPHY HEAD W/O DYE: CPT | Mod: 26

## 2020-07-15 PROCEDURE — A9585: CPT

## 2020-07-15 PROCEDURE — 70548 MR ANGIOGRAPHY NECK W/DYE: CPT

## 2020-07-15 PROCEDURE — 70544 MR ANGIOGRAPHY HEAD W/O DYE: CPT

## 2020-07-23 ENCOUNTER — APPOINTMENT (OUTPATIENT)
Dept: MRI IMAGING | Facility: CLINIC | Age: 25
End: 2020-07-23

## 2020-07-23 ENCOUNTER — OUTPATIENT (OUTPATIENT)
Dept: OUTPATIENT SERVICES | Facility: HOSPITAL | Age: 25
LOS: 1 days | End: 2020-07-23

## 2020-07-23 DIAGNOSIS — Z87.828 PERSONAL HISTORY OF OTHER (HEALED) PHYSICAL INJURY AND TRAUMA: Chronic | ICD-10-CM

## 2020-07-23 DIAGNOSIS — Z00.8 ENCOUNTER FOR OTHER GENERAL EXAMINATION: ICD-10-CM

## 2020-10-16 ENCOUNTER — EMERGENCY (EMERGENCY)
Facility: HOSPITAL | Age: 25
LOS: 1 days | Discharge: HOME | End: 2020-10-16
Attending: STUDENT IN AN ORGANIZED HEALTH CARE EDUCATION/TRAINING PROGRAM | Admitting: STUDENT IN AN ORGANIZED HEALTH CARE EDUCATION/TRAINING PROGRAM
Payer: COMMERCIAL

## 2020-10-16 VITALS
OXYGEN SATURATION: 100 % | SYSTOLIC BLOOD PRESSURE: 129 MMHG | HEIGHT: 74 IN | RESPIRATION RATE: 18 BRPM | HEART RATE: 76 BPM | TEMPERATURE: 98 F | DIASTOLIC BLOOD PRESSURE: 78 MMHG

## 2020-10-16 DIAGNOSIS — Z87.828 PERSONAL HISTORY OF OTHER (HEALED) PHYSICAL INJURY AND TRAUMA: Chronic | ICD-10-CM

## 2020-10-16 PROCEDURE — 99282 EMERGENCY DEPT VISIT SF MDM: CPT

## 2020-10-16 RX ORDER — ASPIRIN/CALCIUM CARB/MAGNESIUM 324 MG
81 TABLET ORAL ONCE
Refills: 0 | Status: COMPLETED | OUTPATIENT
Start: 2020-10-16 | End: 2020-10-16

## 2020-10-16 RX ORDER — ACETAMINOPHEN 500 MG
650 TABLET ORAL ONCE
Refills: 0 | Status: COMPLETED | OUTPATIENT
Start: 2020-10-16 | End: 2020-10-16

## 2020-10-16 RX ADMIN — Medication 650 MILLIGRAM(S): at 10:34

## 2020-10-16 RX ADMIN — Medication 81 MILLIGRAM(S): at 10:34

## 2020-10-16 NOTE — ED PROVIDER NOTE - PATIENT PORTAL LINK FT
You can access the FollowMyHealth Patient Portal offered by [facility] by registering at the following website: [email or web address]. By joining [facility]’s FollowMyHealth portal, you will also be able to view your health information using other applications (apps) compatible with our system.
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
## 2020-10-16 NOTE — ED PROVIDER NOTE - ATTENDING CONTRIBUTION TO CARE
Antoine KEBEDE: I agree with the above provided history and exam    I Scar Schultz MD performed a history and physical exam of the patient and discussed their management with the resident and /or advanced care provider. I reviewed the resident and /or ACP's note and agree with the documented findings and plan of care. My medical decision making and observations are found above.

## 2020-10-16 NOTE — ED PROVIDER NOTE - PHYSICAL EXAMINATION
*GEN:   comfortable, in no acute distress, AOx3  *EYES:   pupils equally round and reactive to light, extra-occular movements intact  *HEENT:   airway patent, moist mucosal membranes, no chipped teeth, +paraspinal low cervical TTP but otherwise full ROM neck w/out midline tenderness to palpation, no bee sign, no septal hematoma or deviation, no maxillary/mandibular mobility  *CV:   regular rate and rhythm  *RESP:   clear to auscultation bilaterally, non-labored  *ABD:   soft, non-tender  *:   no cva/flank tenderness  *MSK:   mild bilateral paraspinal low back tenderness to palpation w/out bruising, no pelvic mobility, ranging L knee fully w/out focal TTP, no MSK tenderness or limited ROM across bilateral upper and lower extremities  *SKIN:  no abrasions \ bruising \ laceration noted  *NEURO:   AOx3, cranial nerves intact throughout, strength 5/5, no focal loss of sensation, no pronator drift, finger/nose normal
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
## 2020-10-16 NOTE — ED PROVIDER NOTE - NSFOLLOWUPINSTRUCTIONS_ED_ALL_ED_FT
Follow up with your PMD within 48-72 hours.      Rest, Take Tylenol 650mg 1 tab every 4-6 hours as needed for pain.     Rest, no heavy lifting.  Warm compresses to area.  If symptoms persist Recommend Ortho consult to discuss possible MRI vs Physical Therapy- referral list provided.  Light walking. Any worsening pain, weakness, numbness, bowel or urinary incontinence return to ER     If you develop significant worsening of pain, profuse vomiting, dizziness, changes in vision, difficulty walking/speaking,  weakness or numbness to your extremities return to the Ed
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
## 2021-03-18 NOTE — ED ADULT NURSE NOTE - NS ED NOTE ABUSE RESPONSE YN
How Severe Is Your Skin Lesion?: mild
Have Your Skin Lesions Been Treated?: not been treated
Is This A New Presentation, Or A Follow-Up?: Skin Lesion
Yes

## 2021-05-24 NOTE — ED ADULT NURSE NOTE - NS ED NURSE LEVEL OF CONSCIOUSNESS SPEECH
Radha Guy was seen today for pre-operative teaching class. She   was educated today on surgery procedure, possible complications, Lovenox self administration (teaching guide given to patient as well as Lovenox DVD viewed today), use of incentive spirometer for 2 weeks prior to surgery date and instructed to remain NPO after midnight the night before surgery, or risk cancellation of surgical procedure. Importance of recognizing signs and symptoms of wound infection were discussed as well as when to contact the physician. We discussed the flow of events on the day of surgery from admit to SDS, OR, PACU, and 7K admit. I reinforced the need to walk post operative on 7K and she voiced understanding of this. Pre-operative measurements were taken and scanned into chart as well as measurement for PO day 1 gastrograffin swallow test. SECA scale completed today. Educated on post-op pain medication and how to dispose of any unused pain medications. Advised to bring in prescription bottle with pain medication to 1 week post-op apt. Speaking Coherently

## 2021-06-17 NOTE — ED PROVIDER NOTE - NS ED ATTENDING STATEMENT MOD
I have personally performed a face to face diagnostic evaluation on this patient. I have reviewed the ACP note and agree with the history, exam and plan of care, except as noted. Debridement Text: The wound edges were debrided prior to proceeding with the closure to facilitate wound healing.

## 2021-09-21 NOTE — ED PROVIDER NOTE - NS_ ATTENDINGSCRIBEDETAILS _ED_A_ED_FT
Hilton Schrader MD - The scribe's documentation has been prepared under my direction and personally reviewed by me in its entirety. I confirm that the note above accurately reflects all work, treatment, procedures, and medical decision making performed by me. Island Pedicle Flap With Canthal Suspension Text: The defect edges were debeveled with a #15 scalpel blade.  Given the location of the defect, shape of the defect and the proximity to free margins an island pedicle advancement flap was deemed most appropriate.  Using a sterile surgical marker, an appropriate advancement flap was drawn incorporating the defect, outlining the appropriate donor tissue and placing the expected incisions within the relaxed skin tension lines where possible. The area thus outlined was incised deep to adipose tissue with a #15 scalpel blade.  The skin margins were undermined to an appropriate distance in all directions around the primary defect and laterally outward around the island pedicle utilizing iris scissors.  There was minimal undermining beneath the pedicle flap. A suspension suture was placed in the canthal tendon to prevent tension and prevent ectropion.

## 2022-03-07 ENCOUNTER — TRANSCRIPTION ENCOUNTER (OUTPATIENT)
Age: 27
End: 2022-03-07

## 2022-04-07 NOTE — ED PROVIDER NOTE - TOBACCO USE
Quality 130: Documentation Of Current Medications In The Medical Record: Current Medications Documented
Quality 226: Preventive Care And Screening: Tobacco Use: Screening And Cessation Intervention: Patient screened for tobacco use and is an ex/non-smoker
Quality 110: Preventive Care And Screening: Influenza Immunization: Influenza immunization was not ordered or administered, reason not given
Detail Level: Detailed
Quality 431: Preventive Care And Screening: Unhealthy Alcohol Use - Screening: Patient identified as an unhealthy alcohol user when screened for unhealthy alcohol use using a systematic screening method and received brief counseling
Unknown if ever smoked
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
## 2022-09-22 NOTE — ED CDU PROVIDER INITIAL DAY NOTE - DATE/TIME 1
pt c/o frequent urination  and high blood sugar since this morning. history of diabetes and UTI. fs at triage 179
26-Sep-2019 20:00

## 2023-02-15 ENCOUNTER — EMERGENCY (EMERGENCY)
Facility: HOSPITAL | Age: 28
LOS: 1 days | Discharge: HOME | End: 2023-02-15
Attending: EMERGENCY MEDICINE
Payer: COMMERCIAL

## 2023-02-15 VITALS
HEART RATE: 63 BPM | RESPIRATION RATE: 18 BRPM | OXYGEN SATURATION: 98 % | SYSTOLIC BLOOD PRESSURE: 151 MMHG | DIASTOLIC BLOOD PRESSURE: 63 MMHG | TEMPERATURE: 98 F

## 2023-02-15 VITALS
HEIGHT: 74 IN | WEIGHT: 229.94 LBS | SYSTOLIC BLOOD PRESSURE: 133 MMHG | DIASTOLIC BLOOD PRESSURE: 84 MMHG | RESPIRATION RATE: 19 BRPM | TEMPERATURE: 98 F | OXYGEN SATURATION: 100 % | HEART RATE: 72 BPM

## 2023-02-15 DIAGNOSIS — Z87.828 PERSONAL HISTORY OF OTHER (HEALED) PHYSICAL INJURY AND TRAUMA: Chronic | ICD-10-CM

## 2023-02-15 LAB
ALBUMIN SERPL ELPH-MCNC: 4.8 G/DL — SIGNIFICANT CHANGE UP (ref 3.3–5)
ALP SERPL-CCNC: 66 U/L — SIGNIFICANT CHANGE UP (ref 40–120)
ALT FLD-CCNC: 39 U/L — SIGNIFICANT CHANGE UP (ref 10–45)
ANION GAP SERPL CALC-SCNC: 10 MMOL/L — SIGNIFICANT CHANGE UP (ref 5–17)
AST SERPL-CCNC: 30 U/L — SIGNIFICANT CHANGE UP (ref 10–40)
BASE EXCESS BLDV CALC-SCNC: 3.7 MMOL/L — HIGH (ref -2–3)
BASOPHILS # BLD AUTO: 0.06 K/UL — SIGNIFICANT CHANGE UP (ref 0–0.2)
BASOPHILS NFR BLD AUTO: 1.2 % — SIGNIFICANT CHANGE UP (ref 0–2)
BILIRUB SERPL-MCNC: 0.4 MG/DL — SIGNIFICANT CHANGE UP (ref 0.2–1.2)
BUN SERPL-MCNC: 8 MG/DL — SIGNIFICANT CHANGE UP (ref 7–23)
CA-I SERPL-SCNC: 1.26 MMOL/L — SIGNIFICANT CHANGE UP (ref 1.15–1.33)
CALCIUM SERPL-MCNC: 9.9 MG/DL — SIGNIFICANT CHANGE UP (ref 8.4–10.5)
CHLORIDE BLDV-SCNC: 102 MMOL/L — SIGNIFICANT CHANGE UP (ref 96–108)
CHLORIDE SERPL-SCNC: 102 MMOL/L — SIGNIFICANT CHANGE UP (ref 96–108)
CO2 BLDV-SCNC: 32 MMOL/L — HIGH (ref 22–26)
CO2 SERPL-SCNC: 27 MMOL/L — SIGNIFICANT CHANGE UP (ref 22–31)
CREAT SERPL-MCNC: 1.04 MG/DL — SIGNIFICANT CHANGE UP (ref 0.5–1.3)
EGFR: 100 ML/MIN/1.73M2 — SIGNIFICANT CHANGE UP
EOSINOPHIL # BLD AUTO: 0.06 K/UL — SIGNIFICANT CHANGE UP (ref 0–0.5)
EOSINOPHIL NFR BLD AUTO: 1.2 % — SIGNIFICANT CHANGE UP (ref 0–6)
FLUAV AG NPH QL: SIGNIFICANT CHANGE UP
FLUBV AG NPH QL: SIGNIFICANT CHANGE UP
GAS PNL BLDV: 136 MMOL/L — SIGNIFICANT CHANGE UP (ref 136–145)
GAS PNL BLDV: SIGNIFICANT CHANGE UP
GAS PNL BLDV: SIGNIFICANT CHANGE UP
GLUCOSE BLDV-MCNC: 90 MG/DL — SIGNIFICANT CHANGE UP (ref 70–99)
GLUCOSE SERPL-MCNC: 93 MG/DL — SIGNIFICANT CHANGE UP (ref 70–99)
HCO3 BLDV-SCNC: 30 MMOL/L — HIGH (ref 22–29)
HCT VFR BLD CALC: 46.6 % — SIGNIFICANT CHANGE UP (ref 39–50)
HCT VFR BLDA CALC: 50 % — SIGNIFICANT CHANGE UP (ref 39–51)
HGB BLD CALC-MCNC: 16.5 G/DL — SIGNIFICANT CHANGE UP (ref 12.6–17.4)
HGB BLD-MCNC: 15.5 G/DL — SIGNIFICANT CHANGE UP (ref 13–17)
HOROWITZ INDEX BLDV+IHG-RTO: SIGNIFICANT CHANGE UP
IMM GRANULOCYTES NFR BLD AUTO: 1.4 % — HIGH (ref 0–0.9)
LACTATE BLDV-MCNC: 1.4 MMOL/L — SIGNIFICANT CHANGE UP (ref 0.5–2)
LYMPHOCYTES # BLD AUTO: 1.64 K/UL — SIGNIFICANT CHANGE UP (ref 1–3.3)
LYMPHOCYTES # BLD AUTO: 31.7 % — SIGNIFICANT CHANGE UP (ref 13–44)
MAGNESIUM SERPL-MCNC: 2 MG/DL — SIGNIFICANT CHANGE UP (ref 1.6–2.6)
MCHC RBC-ENTMCNC: 29.5 PG — SIGNIFICANT CHANGE UP (ref 27–34)
MCHC RBC-ENTMCNC: 33.3 GM/DL — SIGNIFICANT CHANGE UP (ref 32–36)
MCV RBC AUTO: 88.8 FL — SIGNIFICANT CHANGE UP (ref 80–100)
MONOCYTES # BLD AUTO: 0.57 K/UL — SIGNIFICANT CHANGE UP (ref 0–0.9)
MONOCYTES NFR BLD AUTO: 11 % — SIGNIFICANT CHANGE UP (ref 2–14)
NEUTROPHILS # BLD AUTO: 2.78 K/UL — SIGNIFICANT CHANGE UP (ref 1.8–7.4)
NEUTROPHILS NFR BLD AUTO: 53.5 % — SIGNIFICANT CHANGE UP (ref 43–77)
NRBC # BLD: 0 /100 WBCS — SIGNIFICANT CHANGE UP (ref 0–0)
NT-PROBNP SERPL-SCNC: <36 PG/ML — SIGNIFICANT CHANGE UP (ref 0–300)
PCO2 BLDV: 51 MMHG — SIGNIFICANT CHANGE UP (ref 42–55)
PH BLDV: 7.38 — SIGNIFICANT CHANGE UP (ref 7.32–7.43)
PLATELET # BLD AUTO: 282 K/UL — SIGNIFICANT CHANGE UP (ref 150–400)
PO2 BLDV: 40 MMHG — SIGNIFICANT CHANGE UP (ref 25–45)
POTASSIUM BLDV-SCNC: 3.8 MMOL/L — SIGNIFICANT CHANGE UP (ref 3.5–5.1)
POTASSIUM SERPL-MCNC: 4 MMOL/L — SIGNIFICANT CHANGE UP (ref 3.5–5.3)
POTASSIUM SERPL-SCNC: 4 MMOL/L — SIGNIFICANT CHANGE UP (ref 3.5–5.3)
PROT SERPL-MCNC: 7.5 G/DL — SIGNIFICANT CHANGE UP (ref 6–8.3)
RBC # BLD: 5.25 M/UL — SIGNIFICANT CHANGE UP (ref 4.2–5.8)
RBC # FLD: 13 % — SIGNIFICANT CHANGE UP (ref 10.3–14.5)
RSV RNA NPH QL NAA+NON-PROBE: SIGNIFICANT CHANGE UP
SAO2 % BLDV: 68.8 % — SIGNIFICANT CHANGE UP (ref 67–88)
SARS-COV-2 RNA SPEC QL NAA+PROBE: SIGNIFICANT CHANGE UP
SODIUM SERPL-SCNC: 139 MMOL/L — SIGNIFICANT CHANGE UP (ref 135–145)
TROPONIN T, HIGH SENSITIVITY RESULT: 7 NG/L — SIGNIFICANT CHANGE UP (ref 0–51)
WBC # BLD: 5.18 K/UL — SIGNIFICANT CHANGE UP (ref 3.8–10.5)
WBC # FLD AUTO: 5.18 K/UL — SIGNIFICANT CHANGE UP (ref 3.8–10.5)

## 2023-02-15 PROCEDURE — 83880 ASSAY OF NATRIURETIC PEPTIDE: CPT

## 2023-02-15 PROCEDURE — 71275 CT ANGIOGRAPHY CHEST: CPT | Mod: 26,MD

## 2023-02-15 PROCEDURE — 70498 CT ANGIOGRAPHY NECK: CPT | Mod: 26,MD

## 2023-02-15 PROCEDURE — 85018 HEMOGLOBIN: CPT

## 2023-02-15 PROCEDURE — 80053 COMPREHEN METABOLIC PANEL: CPT

## 2023-02-15 PROCEDURE — 82947 ASSAY GLUCOSE BLOOD QUANT: CPT

## 2023-02-15 PROCEDURE — 99285 EMERGENCY DEPT VISIT HI MDM: CPT | Mod: 25

## 2023-02-15 PROCEDURE — 74174 CTA ABD&PLVS W/CONTRAST: CPT | Mod: 26,MD

## 2023-02-15 PROCEDURE — 71045 X-RAY EXAM CHEST 1 VIEW: CPT

## 2023-02-15 PROCEDURE — 70496 CT ANGIOGRAPHY HEAD: CPT | Mod: 26,MD

## 2023-02-15 PROCEDURE — 82435 ASSAY OF BLOOD CHLORIDE: CPT

## 2023-02-15 PROCEDURE — 71045 X-RAY EXAM CHEST 1 VIEW: CPT | Mod: 26

## 2023-02-15 PROCEDURE — 99285 EMERGENCY DEPT VISIT HI MDM: CPT

## 2023-02-15 PROCEDURE — 70496 CT ANGIOGRAPHY HEAD: CPT | Mod: MD

## 2023-02-15 PROCEDURE — 85014 HEMATOCRIT: CPT

## 2023-02-15 PROCEDURE — 36415 COLL VENOUS BLD VENIPUNCTURE: CPT

## 2023-02-15 PROCEDURE — 96374 THER/PROPH/DIAG INJ IV PUSH: CPT | Mod: XU

## 2023-02-15 PROCEDURE — 84484 ASSAY OF TROPONIN QUANT: CPT

## 2023-02-15 PROCEDURE — 83735 ASSAY OF MAGNESIUM: CPT

## 2023-02-15 PROCEDURE — 74174 CTA ABD&PLVS W/CONTRAST: CPT | Mod: MD

## 2023-02-15 PROCEDURE — 82330 ASSAY OF CALCIUM: CPT

## 2023-02-15 PROCEDURE — 71275 CT ANGIOGRAPHY CHEST: CPT | Mod: MD

## 2023-02-15 PROCEDURE — 82803 BLOOD GASES ANY COMBINATION: CPT

## 2023-02-15 PROCEDURE — 84295 ASSAY OF SERUM SODIUM: CPT

## 2023-02-15 PROCEDURE — 84132 ASSAY OF SERUM POTASSIUM: CPT

## 2023-02-15 PROCEDURE — 87637 SARSCOV2&INF A&B&RSV AMP PRB: CPT

## 2023-02-15 PROCEDURE — 70498 CT ANGIOGRAPHY NECK: CPT | Mod: MD

## 2023-02-15 PROCEDURE — 83605 ASSAY OF LACTIC ACID: CPT

## 2023-02-15 PROCEDURE — 93005 ELECTROCARDIOGRAM TRACING: CPT

## 2023-02-15 PROCEDURE — 85025 COMPLETE CBC W/AUTO DIFF WBC: CPT

## 2023-02-15 RX ORDER — ACETAMINOPHEN 500 MG
1000 TABLET ORAL ONCE
Refills: 0 | Status: COMPLETED | OUTPATIENT
Start: 2023-02-15 | End: 2023-02-15

## 2023-02-15 RX ADMIN — Medication 400 MILLIGRAM(S): at 07:34

## 2023-02-15 NOTE — ED ADULT NURSE NOTE - NSICDXPASTMEDICALHX_GEN_ALL_CORE_FT
PAST MEDICAL HISTORY:  Carotid artery dissection     Chronic pain of left knee     History of Stephanie-Parkinson-White (WPW) syndrome     Lumbar disc herniation

## 2023-02-15 NOTE — ED PROVIDER NOTE - NSFOLLOWUPINSTRUCTIONS_ED_ALL_ED_FT
1.  Stay Hydrated  2.  Continue taking all current home medications  3.  Please follow up with your Primary care provider tomorrow as scheduled (Please bring all of your results with you)  4.  Follow up with your Cardiologist upon discharge  4.  Return to the ER for worsening Chest Pain, Shortness of breath or any other concerning symptoms.

## 2023-02-15 NOTE — ED PROVIDER NOTE - ATTENDING CONTRIBUTION TO CARE
Afebrile. Awake and Alert. Lungs CTA. Heart RRR. Abdomen soft NTND. CN II-XII grossly intact. Moves all extremities without lateralization. Afebrile. Awake and Alert. Lungs CTA. Heart RRR. Abdomen soft NTND. CN II-XII grossly intact. Moves all extremities without lateralization. No LE edema. Afebrile. Awake and Alert. Lungs CTA. Heart RRR. Abdomen soft NTND. CN II-XII grossly intact. Moves all extremities without lateralization. No LE edema. Radial pulses +2 bilateral.

## 2023-02-15 NOTE — ED PROVIDER NOTE - DIFFERENTIAL DIAGNOSIS
chest pain r/o ACS, gastritis. aortic dissection Differential Diagnosis chest pain r/o ACS, aortic dissection, GERD, MSK pain

## 2023-02-15 NOTE — ED ADULT NURSE REASSESSMENT NOTE - NS ED NURSE REASSESS COMMENT FT1
Report received from Jesus DAY. AOx3, speaking in complete sentences. Unlabored, spontaneous respirations, NAD. Patient reports CP when moving. CT scans completed.

## 2023-02-15 NOTE — ED PROVIDER NOTE - PHYSICAL EXAMINATION
Gen: WDWN, NAD  HEENT: EOMI, no nasal discharge, mucous membranes moist, PERRLA  CV: RRR, +S1/S2, no M/R/G  Resp: CTAB, no W/R/R  GI: Abdomen soft non-distended, NTTP, no masses  MSK: No open wounds, no bruising, no LE edema  Neuro: A&Ox4, following commands, moving all four extremities spontaneously  Psych: appropriate mood, denies AH, VH, SI

## 2023-02-15 NOTE — ED ADULT TRIAGE NOTE - CHIEF COMPLAINT QUOTE
C/o midsternal chest pain radiating to R side of neck a/w SOB since yesterday. States "very painful with upper body movement." Denies NV, fever, cardiac hx

## 2023-02-15 NOTE — ED PROVIDER NOTE - PROGRESS NOTE DETAILS
Patient seen at bedside in NAD.  VSS.  Patient resting comfortably without complaints. Patient denies active cp.  Labs unremarkable.  CT negative for acute process.  Patient states that he follows up closely with his cardiologist and has had yearly stress tests for abnormal EKG which have been normal.  Patient scheduled to follow up with his PMD tomorrow and will obtain cardiology follow up.  STrict return precautions provided.  -Hong Hitchcock PA-C

## 2023-02-15 NOTE — ED PROVIDER NOTE - OBJECTIVE STATEMENT
27M PMH of carotid dissection (attributed to stress, no underlyinng etiology found) here fo rchest pain and stiffness since 6pm yesterday. Pain midsternal, stiffness radiates to neck and upper back and shoulder, exacerbated by movement. Took 2 tylenol (at 9PM) with no relief , went to bed, but pain woke patient up out of sleep. Endorses diaphoresis. Denies n/v/d/f/c/ab pain/belly pain. Denies tearing sensation that moves to the back. Denies any prodromal cough, congestion, or other URI symptoms.      Carotid dissection attributed to stress

## 2023-02-15 NOTE — ED ADULT NURSE NOTE - OBJECTIVE STATEMENT
28 y/o male presenting to the ER c/o chest pain and stiffness since 6pm yesterday, came to ER fore eval. Pt presents to [facility] A&Ox3, pt placed on continuous cardiac monitoring and pulse oximetry, 18 G IV placed in L. AC by ER RN, pt endorsing R. sided chest pain and stiffness radiating towards R. neck, upper back, and shoulder, which is sharp and stabbing in nature, pain's aggravated upon movement, endorsing diaphoresis as well. PMHx carotid dissection (attributed to stress no underlying etiology found). Pt denies fever/chills, HA, abd pain, N/V/D, lightheadedness/dizziness, numbness/tingling. Pt A&Ox3, breathing spontaneous and unlabored, IV locked and patent, bed locked and in lowest position.

## 2023-02-15 NOTE — ED ADULT NURSE REASSESSMENT NOTE - NS ED NURSE REASSESS COMMENT FT1
PA discussed plan of care and discharge with patient. Refill request for:  gabapentin (NEURONTIN) 100 MG capsule 450 capsule 0 12/23/2019     Sig: Do not start before December 23, 2019. 1 po Q 0800, 1 po  and 3 po Q 2000      metoPROLOL tartrate (LOPRESSOR) 25 MG tablet 90 tablet 1 12/3/2019     Sig - Route: Take 0.5 tablets by mouth 2 times daily      meloxicam (MOBIC) 7.5 MG tablet 90 tablet 1 12/3/2019     Sig - Route: Take 1 tablet by mouth daily      Pt last seen 9/27/19, no future appt.    Per last note:  4. Fibromyalgia  Fair control on Cymbalta and gabapentin.      Okay to refill?  Please advise.

## 2023-02-15 NOTE — ED PROVIDER NOTE - PATIENT PORTAL LINK FT
You can access the FollowMyHealth Patient Portal offered by [facility] by registering at the following website: [email or web address]. By joining [employer]’s FollowMyHealth portal, you will also be able to view your health information using other applications (apps) compatible with our system.

## 2023-02-15 NOTE — ED PROVIDER NOTE - NS ED ROS FT
Gen: No fever, normal appetite  Eyes: No eye irritation or discharge  ENT: No ear pain, congestion, sore throat  Resp: No cough or (+) trouble breathing  Cardiovascular: (+) chest pain or palpitation  Gastroenteric: No nausea/vomiting, diarrhea, constipation  :  No change in urine output; no dysuria  MS: No joint or muscle pain  Skin: No rashes  Neuro: No headache; no abnormal movements  Remainder negative, except as per the HPI

## 2023-07-11 NOTE — ED PROVIDER NOTE - NS ED MD DISPO DISCHARGE CCDA
Patient/Caregiver provided printed discharge information. Calcipotriene Counseling:  I discussed with the patient the risks of calcipotriene including but not limited to erythema, scaling, itching, and irritation.

## 2024-02-28 NOTE — ED PROVIDER NOTE - CLINICAL SUMMARY MEDICAL DECISION MAKING FREE TEXT BOX
Thank you for choosing us for your care. Based on your symptoms and length of illness, I do not think that you need a prescription at this time.  Please follow the care advise I've provided and use the over the counter medications to help relieve your symptoms.   View your full visit summary for details by clicking on the link below.     If you're not feeling better within 2-3 days, please respond to this message and we can consider if a prescription is needed.  You can schedule an appointment right here in [facility], or call [phone]  If the visit is for the same symptoms as your eVisit, we'll refund the cost of your eVisit if seen within seven days.      
27M PMH of carotid dissection here for chest pain as described In HPI. VSS, PE as above. Given PMH of dissection without clear etiology will do CTA chest, head and neck to r/o dissection, pain control and cardiac w/u. Dispo pending clinical course.

## 2024-04-08 PROBLEM — Z86.79 PERSONAL HISTORY OF OTHER DISEASES OF THE CIRCULATORY SYSTEM: Chronic | Status: ACTIVE | Noted: 2023-02-15

## 2024-04-08 PROBLEM — I77.71 DISSECTION OF CAROTID ARTERY: Chronic | Status: ACTIVE | Noted: 2023-02-15

## 2024-04-19 ENCOUNTER — OUTPATIENT (OUTPATIENT)
Dept: OUTPATIENT SERVICES | Facility: HOSPITAL | Age: 29
LOS: 1 days | End: 2024-04-19
Payer: MEDICAID

## 2024-04-19 ENCOUNTER — APPOINTMENT (OUTPATIENT)
Dept: ULTRASOUND IMAGING | Facility: CLINIC | Age: 29
End: 2024-04-19
Payer: MEDICAID

## 2024-04-19 DIAGNOSIS — Z87.828 PERSONAL HISTORY OF OTHER (HEALED) PHYSICAL INJURY AND TRAUMA: Chronic | ICD-10-CM

## 2024-04-19 DIAGNOSIS — Z00.8 ENCOUNTER FOR OTHER GENERAL EXAMINATION: ICD-10-CM

## 2024-04-19 PROCEDURE — 76700 US EXAM ABDOM COMPLETE: CPT

## 2024-04-19 PROCEDURE — 76700 US EXAM ABDOM COMPLETE: CPT | Mod: 26

## 2025-05-29 NOTE — ED ADULT NURSE NOTE - NS ED NURSE RECORD ANOTHER HT AND WT
[facility] provides services at a reduced cost to those who are determined to be eligible through [facility]’s financial assistance program. Information regarding [facility]’s financial assistance program can be found by going to [email or web address] or by calling [phone]. Yes